# Patient Record
Sex: MALE | Race: WHITE | NOT HISPANIC OR LATINO | Employment: UNEMPLOYED | URBAN - METROPOLITAN AREA
[De-identification: names, ages, dates, MRNs, and addresses within clinical notes are randomized per-mention and may not be internally consistent; named-entity substitution may affect disease eponyms.]

---

## 2023-12-01 ENCOUNTER — TELEMEDICINE (OUTPATIENT)
Dept: BEHAVIORAL HEALTH | Facility: CLINIC | Age: 28
End: 2023-12-01
Payer: MEDICAID

## 2023-12-01 DIAGNOSIS — G40.309 NONINTRACTABLE GENERALIZED IDIOPATHIC EPILEPSY WITHOUT STATUS EPILEPTICUS (MULTI): ICD-10-CM

## 2023-12-01 DIAGNOSIS — F84.0 AUTISTIC DISORDER (HHS-HCC): ICD-10-CM

## 2023-12-01 DIAGNOSIS — Z79.899 HIGH RISK MEDICATION USE: ICD-10-CM

## 2023-12-01 DIAGNOSIS — F41.1 GAD (GENERALIZED ANXIETY DISORDER): ICD-10-CM

## 2023-12-01 DIAGNOSIS — F91.9 DISRUPTIVE BEHAVIOR DISORDER: Primary | ICD-10-CM

## 2023-12-01 DIAGNOSIS — F72 INTELLECTUAL DEVELOPMENTAL DISORDER, SEVERE: ICD-10-CM

## 2023-12-01 PROCEDURE — 90792 PSYCH DIAG EVAL W/MED SRVCS: CPT | Performed by: PSYCHIATRY & NEUROLOGY

## 2023-12-01 NOTE — PROGRESS NOTES
"Outpatient Psychiatry    A HIPAA-compliant interactive audio and video telecommunication system which permits real time communications between the patient (at the originating site) and provider (at the distant site) was utilized to provide this telehealth service.     The patient, family, caregivers and guardian (as appropriate) have provided consent on this date to conduct treatment via this telehealth service.  The patient's identity and physical location were verified at the time of this visit.      Present for appointment: Damian, mom/guardian (Elizabeth) and [facility] management (Monica).    SUBJECTIVE    Damian is a 28 year old man with IDD, autism, anxiety and aggressive behaviors presenting today to transfer care to our clinic.      He has most recently been followed at [facility] for psychiatric treatment for about the past 6 months; his most recent visit was 11/18/23 and no changes were made at that time.      He had been treated for most of his childhood and adolescence by his pediatric neurologist, Dr. Venus Flynn.  He has been tried on several different medications without symptomatic improvement.  Mom thought that he might have started having more problems after being taken off Depakote several years ago, but he did not have a positive response when Depakote was recently reintroduced.  He was reportedly tried on Ritalin in elementary school for problems with attention and hyperactivity, but mom reports that this just made him more irritable and \"mean.\"  Olanzapine was started in 2017.  He had been on higher doses of olanzapine (up to 10mg TID) but mom reports that this had caused an apparent increase in agitation.  The most recent medication change was the addition of scheduled lorazepam (around June/July 2023).    The as-needed medication (Haldol) does not seem to be helpful.  He receives it, on average, about two times per week.  Of note, he did receive it earlier today (around 11:00) for " agitation.    Mom reports that he was mostly a happy and easy-going child.  He really did not start having significant mood or behavioral problems until about 7-8 years ago.  Since then he has appeared more irritable and dixon.  He talks excessively and is usually quite loud.  He has problems with focus/concentration and impulsivity.  He can help with some chores around the house.  He is mostly independent in ADLs.  He likes going for rides to look at trucks on the road.    Current behavioral problems include physical aggression towards staff in the home, throwing/breaking property, self-injurious behaviors, emotional lability, intentionally urinating on himself or the floor.    He does not seem to go through discrete mood episodes.  He does not seem to be internally stimulated.  He has not had any previous inpatient psychiatric hospitalizations.    Towards the end of today's visit he starts to have an outburst because he did not want to be directed to use the restroom.  He came out of his room with his pants down; screaming/crying; hit himself in the head a few times.    He grew up around [place], [place].  He attended school through [employer] as well as the [facility] through [facility] [facility].  He aged out of school when he was 23.  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  Parents  about 8 years ago and Damian moved in with mom.  He sees dad on weekends.  He has one full biological sister and one step-sister.  Mom reports no other known family psychiatric history.  He does not use any tobacco, alcohol or other illicit drugs.  He moved into his current residential setting as of 10/07/2023.  Prior to this he had lived with mom.  He recently had a home visit with mom at [facility]; this was his first  time being back home since moving into his new home and mom says overall the visit went fairly well and he transitioned back home without any issues.    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  He had meningitis (likely viral) at 6 months of age.  He had some genetic testing done, but no clear etiology for his ASD/IDD was identified.  He is not currently treated for other medical problems.    He was diagnosed with seizures at age 3.  Initially, he would only have seizures when he had a febrile illness, but eventually he started having more unprovoked seizures.  He started taking routine anticonvulsants for seizures at age 11.  He began having GTC seizures about 1.5 to 2 years ago.  His last known seizure was > 6 months ago.      Review of Systems   Constitutional:  Positive for unexpected weight change (decrease over past few months).   HENT:  Negative for drooling and trouble swallowing.    Respiratory:  Negative for choking.    Gastrointestinal:  Negative for constipation, diarrhea and vomiting.   Genitourinary:  Positive for enuresis.   Musculoskeletal:  Negative for gait problem.   Neurological:  Negative for tremors, seizures and syncope.   Psychiatric/Behavioral:  Positive for agitation, behavioral problems and self-injury. Negative for sleep disturbance. The patient is hyperactive.      MEDICATION HISTORY  Atomoxetine  Buspirone  Clonazepam  Concerta  Depakote  Dexedrine  Fluoxetine  Quetiapine  Risperidone  Sertraline  Ziprasidone     Controlled Substance Evaluation  OARRS/PDMP reviewed: Emil Boone MD on 12/1/2023  9:31 AM  Is the patient prescribed a combination of a benzodiazepine and opioid? No  I have personally reviewed the OARRS report for Damian Martinez.   I have considered the risks of abuse, dependence, addiction and diversion.    I believe that it is clinically appropriate for Damian Martinez to be prescribed this medication.    Last Urine Drug Screen:  No results found for this or  any previous visit (from the past 8760 hour(s)).  Clinical rationale for not completing a Urine Drug Screen: UDS Not Completed: Patient in managed care facility (nursing home, assisted living, other where patient does not self-administer medication)  Results as expected? N/A    Controlled Substance Agreement: Will be sent to guardian  Reviewed Controlled Substance Agreement, including but not limited to:  Benefits, risks, and alternatives to treatment with a controlled substance medication(s).    Prescribed Controlled Substances:  Benzodiazepines: Lorazepam  What is the patient's goal of therapy? Agitation/anxiety  Is this being achieved with current treatment? Somewhat  Activities of Daily Living:   Is your overall impression that this patient is benefiting (symptom reduction outweighs side effects) from benzodiazepine therapy? Yes   1. Physical Functioning: Same  2. Family Relationship: Same  3. Social Relationship: Same  4. Mood: Same  5. Sleep Patterns: Same  6. Overall Function: Same     CURRENT MEDICATIONS    Current Outpatient Medications:     haloperidol (Haldol) 5 mg tablet, , Disp: , Rfl:     lacosamide (Vimpat) 100 mg tablet, TAKE ONE  AND ONE-HALF TABLET BY MOUTH IN THE MORNING  TAKE ONE TABLET MIDDAY  AND TAKE ONE AND ONE-HALF TABLET AT BEDTIME, Disp: , Rfl:     LORazepam (Ativan) 1 mg tablet, Take 1 tablet (1 mg) by mouth 3 times a day., Disp: , Rfl:     nasal spray Nayzilam 5 mg/spray (0.1 mL) spray,non-aerosol, , Disp: , Rfl:     OLANZapine (ZyPREXA) 10 mg tablet, Take 2 tablets (20 mg) by mouth once daily. Take 1 tablet QAM, 1/2 tablet mid-day, and 1/2 tablet at bedtime, Disp: , Rfl:     polyethylene glycol (Glycolax, Miralax) 17 gram/dose powder, Take 17 g by mouth once daily., Disp: , Rfl:     topiramate (Topamax) 100 mg tablet, Take 1 tablet (100 mg) by mouth 2 times a day., Disp: , Rfl:     lamoTRIgine (LaMICtal) 100 mg tablet, Take 2 tablets (200 mg) by mouth 2 times a day., Disp: , Rfl:  "    SOCIAL HISTORY  Living situation [facility] home with 2:1 staff   Provider agency [facility] as of October 2023   Work or day program None currently   School N/A   Guardianship Mother (Elizabeth)   SSA ?   Bx Specialist ?   Nicotine None   Alcohol None   Other drugs None     OBJECTIVE    No results found for: \"HGB\", \"PLT\", \"NEUTROABS\", \"GLUCOSE\", \"NA\", \"K\", \"CO2\", \"CALCIUM\", \"CREATININE\", \"AST\", \"ALT\", \"HGBA1C\", \"AMMONIA\", \"TSH\", \"FREET4\", \"CHOL\", \"LDLF\", \"TRIG\", \"[id number]\", \"VITD25\"    Therapeutic Drug Monitoring  04/26/2023: Lamotrigine level = 16.7 [1-13] (400mg/day)  04/26/2023: Lacosamide level = 6.4 [2.2-19.8] (400mg/day)    Electrocardiograms  12/21/2022: SR, VR 67, QTc 419    MENTAL STATUS EXAM  General/Appearance: Appropriate dress/hygiene/grooming. Thin build. Acneic skin.  Attitude/Behavior: Difficult to engage. Guarded/apprehensive. Poor/limited eye contact.  Speech/Communication:  Just repeats \"pee your pants\" several times when asked questions.  Motor: Fidgets. No abnormal or involuntary movements observed.  Gait: Ambulates w/o difficulty.  Mood: Appears upset/irritable.  Affect: Dysphoric. Intense. Irritable.  Thought processes: Perseverative.  Thought content: Unable to assess.  Perception: Does not appear to be experiencing or responding to hallucinations.  Sensorium/Cognition: Oriented to self and surroundings. Minimal interest in participating.  Memory: Recent & remote recall is intact.  Insight: Minimal.  Judgment: Poor. Impulsive.    ASSESSMENT  Reviewed past medication trials and [employer] pharmacogenomic testing.  Discussed treatment options with guardian to target mood and impulsivity.  May consider trial of anti-adrenergic agents (beta blocker or alpha agonist) for impulsivity.  Might also consider trying an \"atypical\" antidepressant like mirtazapine for mood depending on how he responds to lithium.    PROBLEM LIST  Intellectual developmental disorder, severe  ASD  Disruptive " behavior  Anxiety  Epilepsy    PLAN  -- Check labs (BMP/TSH and serum olanzapine level)  -- Start lithium 300mg at bedtime for mood and aggression  -- Continue olanzapine 10mg - 5mg - 5mg for aggression and impulsivity  -- Continue lorazepam 1mg TID for anxiety  -- Change PRN to chlorpromazine 50mg Q6H  -- Follow up 10 weeks    Emil Boone MD    Prep time on date of the patient encounter: 5 minutes   Time spent directly with patient/family/caregiver: 90 minutes   Additional time spent on patient care activities: 0 minutes   Documentation time: 15 minutes   Other time spent: 0 minutes   Total time on date of patient encounter: 110 minutes

## 2023-12-02 PROBLEM — F41.1 GAD (GENERALIZED ANXIETY DISORDER): Status: ACTIVE | Noted: 2023-12-02

## 2023-12-02 PROBLEM — F72 INTELLECTUAL DEVELOPMENTAL DISORDER, SEVERE: Status: ACTIVE | Noted: 2023-12-02

## 2023-12-02 RX ORDER — OLANZAPINE 10 MG/1
10 TABLET ORAL EVERY MORNING
Qty: 31 TABLET | Refills: 2 | Status: SHIPPED | OUTPATIENT
Start: 2023-12-02 | End: 2024-02-02 | Stop reason: SDUPTHER

## 2023-12-02 RX ORDER — LORAZEPAM 1 MG/1
1 TABLET ORAL 3 TIMES DAILY
Qty: 93 TABLET | Refills: 2 | Status: SHIPPED | OUTPATIENT
Start: 2023-12-02 | End: 2024-02-02 | Stop reason: SDUPTHER

## 2023-12-02 RX ORDER — POLYETHYLENE GLYCOL 3350 17 G/17G
17 POWDER, FOR SOLUTION ORAL DAILY
COMMUNITY
Start: 2016-11-30

## 2023-12-02 RX ORDER — LAMOTRIGINE 100 MG/1
200 TABLET ORAL 2 TIMES DAILY
COMMUNITY

## 2023-12-02 RX ORDER — LACOSAMIDE 100 MG/1
TABLET ORAL
COMMUNITY
Start: 2022-07-10

## 2023-12-02 RX ORDER — OLANZAPINE 10 MG/1
2 TABLET ORAL
COMMUNITY
Start: 2018-03-17 | End: 2023-12-02 | Stop reason: SDUPTHER

## 2023-12-02 RX ORDER — OLANZAPINE 5 MG/1
TABLET ORAL
Qty: 62 TABLET | Refills: 2 | Status: SHIPPED | OUTPATIENT
Start: 2023-12-02 | End: 2024-02-02 | Stop reason: SDUPTHER

## 2023-12-02 RX ORDER — TOPIRAMATE 100 MG/1
100 TABLET, FILM COATED ORAL 2 TIMES DAILY
COMMUNITY
Start: 2023-11-04

## 2023-12-02 RX ORDER — CHLORPROMAZINE HYDROCHLORIDE 50 MG/1
TABLET, FILM COATED ORAL
Qty: 60 TABLET | Refills: 0 | Status: SHIPPED | OUTPATIENT
Start: 2023-12-02 | End: 2024-02-02 | Stop reason: ALTCHOICE

## 2023-12-02 RX ORDER — MIDAZOLAM 5 MG/.1ML
SPRAY NASAL
COMMUNITY
Start: 2023-10-06

## 2023-12-02 RX ORDER — LORAZEPAM 1 MG/1
1 TABLET ORAL 3 TIMES DAILY
COMMUNITY
Start: 2023-05-27 | End: 2023-12-02 | Stop reason: SDUPTHER

## 2023-12-02 RX ORDER — HALOPERIDOL 5 MG/1
TABLET ORAL
COMMUNITY
Start: 2023-10-06 | End: 2023-12-02

## 2023-12-02 ASSESSMENT — ENCOUNTER SYMPTOMS
TREMORS: 0
VOMITING: 0
HYPERACTIVE: 1
CONSTIPATION: 0
TROUBLE SWALLOWING: 0
AGITATION: 1
UNEXPECTED WEIGHT CHANGE: 1
CHOKING: 0
SLEEP DISTURBANCE: 0
DIARRHEA: 0
SEIZURES: 0

## 2023-12-02 NOTE — PATIENT INSTRUCTIONS
Check updated/baseline labs (metabolic panel, thyroid, Zyprexa level) -- if within normal range we will start trial of lithium 300mg once daily at bedtime for mood and impulsive aggression.  Note - labs do NOT need to be fasting.    CHANGE as-needed medication to Thorazine (chlorpromazine) 50mg by mouth every 6 hours (discontinue Haldol).    Next appointment: Friday 2/09/2024 at 10:30 AM lynne.

## 2023-12-05 ENCOUNTER — TELEPHONE (OUTPATIENT)
Dept: OTHER | Age: 28
End: 2023-12-05
Payer: MEDICAID

## 2023-12-18 DIAGNOSIS — F91.9 DISRUPTIVE BEHAVIOR DISORDER: ICD-10-CM

## 2023-12-18 RX ORDER — LITHIUM CARBONATE 300 MG/1
300 CAPSULE ORAL NIGHTLY
Qty: 31 CAPSULE | Refills: 2 | Status: SHIPPED | OUTPATIENT
Start: 2023-12-18 | End: 2024-02-02 | Stop reason: SDUPTHER

## 2023-12-18 NOTE — PROGRESS NOTES
Lab results faxed over from [facility]:  -- BMP, TSH and FT4 all normal.  -- Serum olanzapine level pending.  Sent Rx to start lithium and updated agency via email.

## 2024-02-02 ENCOUNTER — TELEMEDICINE (OUTPATIENT)
Dept: BEHAVIORAL HEALTH | Facility: CLINIC | Age: 29
End: 2024-02-02
Payer: MEDICAID

## 2024-02-02 DIAGNOSIS — G40.309 NONINTRACTABLE GENERALIZED IDIOPATHIC EPILEPSY WITHOUT STATUS EPILEPTICUS (MULTI): ICD-10-CM

## 2024-02-02 DIAGNOSIS — F41.1 GAD (GENERALIZED ANXIETY DISORDER): ICD-10-CM

## 2024-02-02 DIAGNOSIS — F91.9 DISRUPTIVE BEHAVIOR DISORDER: Primary | ICD-10-CM

## 2024-02-02 DIAGNOSIS — F84.0 AUTISTIC DISORDER (HHS-HCC): ICD-10-CM

## 2024-02-02 DIAGNOSIS — F72 INTELLECTUAL DEVELOPMENTAL DISORDER, SEVERE: ICD-10-CM

## 2024-02-02 PROCEDURE — 99214 OFFICE O/P EST MOD 30 MIN: CPT | Performed by: PSYCHIATRY & NEUROLOGY

## 2024-02-02 RX ORDER — OLANZAPINE 5 MG/1
TABLET ORAL
Qty: 62 TABLET | Refills: 1 | Status: SHIPPED | OUTPATIENT
Start: 2024-02-02 | End: 2024-03-22 | Stop reason: SDUPTHER

## 2024-02-02 RX ORDER — OLANZAPINE 10 MG/1
10 TABLET ORAL EVERY MORNING
Qty: 31 TABLET | Refills: 1 | Status: SHIPPED | OUTPATIENT
Start: 2024-02-02 | End: 2024-03-22 | Stop reason: SDUPTHER

## 2024-02-02 RX ORDER — HALOPERIDOL 5 MG/1
TABLET ORAL
Qty: 60 TABLET | Refills: 0 | Status: SHIPPED | OUTPATIENT
Start: 2024-02-02 | End: 2024-03-22 | Stop reason: DRUGHIGH

## 2024-02-02 RX ORDER — LITHIUM CARBONATE 600 MG/1
600 CAPSULE ORAL NIGHTLY
Qty: 31 CAPSULE | Refills: 1 | Status: SHIPPED | OUTPATIENT
Start: 2024-02-02 | End: 2024-03-22 | Stop reason: SDUPTHER

## 2024-02-02 RX ORDER — LORAZEPAM 1 MG/1
1 TABLET ORAL 3 TIMES DAILY
Qty: 93 TABLET | Refills: 1 | Status: SHIPPED | OUTPATIENT
Start: 2024-02-02 | End: 2024-03-22 | Stop reason: SDUPTHER

## 2024-02-02 ASSESSMENT — ENCOUNTER SYMPTOMS
VOMITING: 0
SLEEP DISTURBANCE: 0
CHOKING: 0
DIARRHEA: 0
CONSTIPATION: 0
TROUBLE SWALLOWING: 0
HYPERACTIVE: 1
AGITATION: 1
TREMORS: 0
SEIZURES: 0

## 2024-02-02 NOTE — PATIENT INSTRUCTIONS
Increase lithium to 600mg daily for mood stabilization and aggression.  Change as-needed medication to Haldol 5mg PO every 6 hours.  Next appointment: Friday 3/22/2024 at 9:45 AM virtual.

## 2024-02-02 NOTE — PROGRESS NOTES
Outpatient Psychiatry    A HIPAA-compliant interactive audio and video telecommunication system which permits real time communications between the patient (at the originating site) and provider (at the distant site) was utilized to provide this telehealth service.     The patient, family, caregivers and guardian (as appropriate) have provided consent on this date to conduct treatment via this telehealth service.  The patient's identity and physical location were verified at the time of this visit.      Present for appointment: Damian, mom/guardian (Elizabeth) and [facility] management (Monica).    SUBJECTIVE    No significant changes or improvements since last visit.        Damian continues to have significant problems with physical aggression towards staff in the home, throwing/breaking property, self-injurious behaviors, emotional lability, obsessive/perseverative thinking/talking.    As-needed chlorpromazine utilized 16 times in January (received two doses on 1/01 and 1/19).  They do not perceived the chlorpromazine to be particularly helpful.    He does not seem to be having side effects from the lithium.    He has not had any seizures since last visit.  Current ASMs: lacosamide 400mg/day, lamotrigine 400mg/day, topiramate 200mg/day.    Review of Systems   Constitutional:  Negative for activity change and appetite change.   HENT:  Negative for drooling and trouble swallowing.    Respiratory:  Negative for choking.    Gastrointestinal:  Negative for constipation, diarrhea and vomiting.   Endocrine: Negative for polydipsia and polyuria.   Genitourinary:  Negative for enuresis.   Musculoskeletal:  Negative for gait problem.   Neurological:  Negative for tremors, seizures and syncope.   Psychiatric/Behavioral:  Positive for agitation, behavioral problems and self-injury. Negative for sleep disturbance. The patient is hyperactive.      MEDICATION  HISTORY  Atomoxetine  Buspirone  Clonazepam  Concerta  Depakote  Dexedrine  Fluoxetine  Quetiapine  Risperidone  Sertraline  Ziprasidone     Controlled Substance Evaluation  OARRS/PDMP reviewed: Emil Boone MD on 2/2/2024  7:27 AM  Is the patient prescribed a combination of a benzodiazepine and opioid? No  I have personally reviewed the OARRS report for Damian Martinez.   I have considered the risks of abuse, dependence, addiction and diversion.    I believe that it is clinically appropriate for Damian Martinez to be prescribed this medication.    Last Urine Drug Screen:  No results found for this or any previous visit (from the past 8760 hour(s)).  Clinical rationale for not completing a Urine Drug Screen: UDS Not Completed: Patient in managed care facility (nursing home, assisted living, other where patient does not self-administer medication)  Results as expected? N/A    Controlled Substance Agreement: 12/05/2023  Reviewed Controlled Substance Agreement, including but not limited to:  Benefits, risks, and alternatives to treatment with a controlled substance medication(s).    Prescribed Controlled Substances:  Benzodiazepines: Lorazepam  What is the patient's goal of therapy? Agitation/anxiety  Is this being achieved with current treatment? Somewhat  Activities of Daily Living:   Is your overall impression that this patient is benefiting (symptom reduction outweighs side effects) from benzodiazepine therapy? Yes   1. Physical Functioning: Same  2. Family Relationship: Same  3. Social Relationship: Same  4. Mood: Same  5. Sleep Patterns: Same  6. Overall Function: Same     CURRENT MEDICATIONS    Current Outpatient Medications:     haloperidol (Haldol) 5 mg tablet, Give 1 tablet by mouth every 6 hours as needed for symptoms of disruptive behavior disorder (severe SIB or aggression) that lasts > 15 minutes despite attempts to behaviorally redirect., Disp: 60 tablet, Rfl: 0    lacosamide (Vimpat) 100 mg tablet,  "TAKE ONE  AND ONE-HALF TABLET BY MOUTH IN THE MORNING  TAKE ONE TABLET MIDDAY  AND TAKE ONE AND ONE-HALF TABLET AT BEDTIME, Disp: , Rfl:     lamoTRIgine (LaMICtal) 100 mg tablet, Take 2 tablets (200 mg) by mouth 2 times a day., Disp: , Rfl:     lithium 600 mg capsule, Take 1 capsule (600 mg) by mouth once daily at bedtime., Disp: 31 capsule, Rfl: 1    LORazepam (Ativan) 1 mg tablet, Take 1 tablet (1 mg) by mouth 3 times a day., Disp: 93 tablet, Rfl: 1    nasal spray Nayzilam 5 mg/spray (0.1 mL) spray,non-aerosol, , Disp: , Rfl:     OLANZapine (ZyPREXA) 10 mg tablet, Take 1 tablet (10 mg) by mouth once daily in the morning., Disp: 31 tablet, Rfl: 1    OLANZapine (ZyPREXA) 5 mg tablet, Take 1 tablet (5 mg) by mouth twice daily in the afternoon and evening., Disp: 62 tablet, Rfl: 1    polyethylene glycol (Glycolax, Miralax) 17 gram/dose powder, Take 17 g by mouth once daily., Disp: , Rfl:     topiramate (Topamax) 100 mg tablet, Take 1 tablet (100 mg) by mouth 2 times a day., Disp: , Rfl:     SOCIAL HISTORY  Living situation [facility] home with 2:1 staff   Provider agency [facility] as of October 2023   Work or day program None currently   School N/A   Guardianship Mother (Elizabeth)   SSA ?   Bx Specialist ?   Nicotine None   Alcohol None   Other drugs None     OBJECTIVE    No results found for: \"HGB\", \"PLT\", \"NEUTROABS\", \"GLUCOSE\", \"NA\", \"K\", \"CO2\", \"CALCIUM\", \"CREATININE\", \"AST\", \"ALT\", \"HGBA1C\", \"AMMONIA\", \"TSH\", \"FREET4\", \"CHOL\", \"LDLF\", \"TRIG\", \"[id number]\", \"VITD25\"    Therapeutic Drug Monitoring  12/11/2023: Lamotrigine level = 10.2 [1-13] (400mg/day)  12/11/2023: Lacosamide level = 9.7 [2.2-19.8] (400mg/day)  12/11/2023: Topiramate level = 7.7 [5-20] (200mg/day)  04/26/2023: Lamotrigine level = 16.7 [1-13] (400mg/day)  04/26/2023: Lacosamide level = 6.4 [2.2-19.8] (400mg/day)    Electrocardiograms  12/21/2022: SR, VR 67, QTc 419    MENTAL STATUS EXAM  General/Appearance: Appropriate dress/hygiene/grooming. Thin " build. Acneic skin.  Attitude/Behavior: Difficult to engage. Guarded/apprehensive. Poor/limited eye contact.  Speech/Communication: Repetitive phrases. Loud volume.  Motor: Fidgets. No abnormal or involuntary movements observed.  Gait: Ambulates w/o difficulty.  Mood: Appears upset/irritable.  Affect: Dysphoric. Intense. Irritable.  Thought processes: Perseverative.  Thought content: Unable to assess. Repeatedly asks about a certain staff worker.  Perception: Does not appear to be experiencing or responding to hallucinations.  Sensorium/Cognition: Oriented to self and surroundings. Minimal interest in participating.  Memory: Recent & remote recall is intact.  Insight: Minimal.  Judgment: Poor. Impulsive.    ASSESSMENT  No changes (better or worse) noted since starting lithium trial -- increase dose again today.  We will try changing the as-needed medication back to Haldol at a dose of 5mg.  We may consider increasing lorazepam versus a trial of an alpha agonist at next follow-up.    PROBLEM LIST  Intellectual developmental disorder, severe  ASD  Disruptive behavior  Anxiety  Epilepsy    PLAN  -- Increase lithium to 600mg at bedtime for mood and aggression  -- Continue olanzapine 10mg - 5mg - 5mg for aggression and impulsivity  -- Continue lorazepam 1mg TID for anxiety  -- Change PRN to Haldol 5mg PO Q6H  -- Follow up about 8 weeks    Emil Boone MD    Prep time on date of the patient encounter: 5 minutes   Time spent directly with patient/family/caregiver: 30 minutes   Additional time spent on patient care activities: 0 minutes   Documentation time: 5 minutes   Other time spent: 0 minutes   Total time on date of patient encounter: 40 minutes

## 2024-02-03 ASSESSMENT — ENCOUNTER SYMPTOMS
ACTIVITY CHANGE: 0
POLYDIPSIA: 0
APPETITE CHANGE: 0

## 2024-02-09 ENCOUNTER — APPOINTMENT (OUTPATIENT)
Dept: BEHAVIORAL HEALTH | Facility: CLINIC | Age: 29
End: 2024-02-09
Payer: MEDICAID

## 2024-03-18 DIAGNOSIS — F91.9 DISRUPTIVE BEHAVIOR DISORDER: ICD-10-CM

## 2024-03-21 ENCOUNTER — TELEPHONE (OUTPATIENT)
Dept: BEHAVIORAL HEALTH | Facility: CLINIC | Age: 29
End: 2024-03-21
Payer: MEDICAID

## 2024-03-22 ENCOUNTER — TELEMEDICINE (OUTPATIENT)
Dept: BEHAVIORAL HEALTH | Facility: CLINIC | Age: 29
End: 2024-03-22
Payer: MEDICAID

## 2024-03-22 DIAGNOSIS — F84.0 AUTISTIC DISORDER (HHS-HCC): ICD-10-CM

## 2024-03-22 DIAGNOSIS — F91.9 DISRUPTIVE BEHAVIOR DISORDER: Primary | ICD-10-CM

## 2024-03-22 DIAGNOSIS — G40.309 NONINTRACTABLE GENERALIZED IDIOPATHIC EPILEPSY WITHOUT STATUS EPILEPTICUS (MULTI): ICD-10-CM

## 2024-03-22 DIAGNOSIS — F72 INTELLECTUAL DEVELOPMENTAL DISORDER, SEVERE: ICD-10-CM

## 2024-03-22 DIAGNOSIS — Z79.899 HIGH RISK MEDICATION USE: ICD-10-CM

## 2024-03-22 DIAGNOSIS — F41.1 GAD (GENERALIZED ANXIETY DISORDER): ICD-10-CM

## 2024-03-22 PROCEDURE — 99214 OFFICE O/P EST MOD 30 MIN: CPT | Performed by: PSYCHIATRY & NEUROLOGY

## 2024-03-22 PROCEDURE — 1036F TOBACCO NON-USER: CPT | Performed by: PSYCHIATRY & NEUROLOGY

## 2024-03-22 RX ORDER — CLONIDINE HYDROCHLORIDE 0.1 MG/1
TABLET ORAL
Qty: 62 TABLET | Refills: 2 | Status: SHIPPED | OUTPATIENT
Start: 2024-03-22 | End: 2024-05-13 | Stop reason: SDUPTHER

## 2024-03-22 RX ORDER — HALOPERIDOL 10 MG/1
TABLET ORAL
Qty: 30 TABLET | Refills: 0 | Status: SHIPPED | OUTPATIENT
Start: 2024-03-22 | End: 2024-05-13 | Stop reason: ALTCHOICE

## 2024-03-22 RX ORDER — LITHIUM CARBONATE 600 MG/1
600 CAPSULE ORAL NIGHTLY
Qty: 29 CAPSULE | Refills: 11 | OUTPATIENT
Start: 2024-03-22

## 2024-03-22 RX ORDER — OLANZAPINE 10 MG/1
10 TABLET ORAL EVERY MORNING
Qty: 31 TABLET | Refills: 2 | Status: SHIPPED | OUTPATIENT
Start: 2024-03-22 | End: 2024-05-13 | Stop reason: SDUPTHER

## 2024-03-22 RX ORDER — OLANZAPINE 5 MG/1
TABLET ORAL
Qty: 62 TABLET | Refills: 2 | Status: SHIPPED | OUTPATIENT
Start: 2024-03-22 | End: 2024-05-13 | Stop reason: SDUPTHER

## 2024-03-22 RX ORDER — LORAZEPAM 1 MG/1
1 TABLET ORAL 3 TIMES DAILY
Qty: 93 TABLET | Refills: 2 | Status: SHIPPED | OUTPATIENT
Start: 2024-03-22 | End: 2024-05-13 | Stop reason: SDUPTHER

## 2024-03-22 RX ORDER — LITHIUM CARBONATE 600 MG/1
600 CAPSULE ORAL NIGHTLY
Qty: 31 CAPSULE | Refills: 2 | Status: SHIPPED | OUTPATIENT
Start: 2024-03-22 | End: 2024-05-13 | Stop reason: SDUPTHER

## 2024-03-22 ASSESSMENT — ENCOUNTER SYMPTOMS
APPETITE CHANGE: 0
VOMITING: 0
SEIZURES: 0
AGITATION: 1
POLYDIPSIA: 0
TROUBLE SWALLOWING: 0
DIARRHEA: 0
SLEEP DISTURBANCE: 0
ACTIVITY CHANGE: 0
TREMORS: 0
CHOKING: 0
CONSTIPATION: 0
HYPERACTIVE: 1

## 2024-03-22 NOTE — PATIENT INSTRUCTIONS
Start clonidine 0.1mg twice daily (8am and 12pm) for impulsivity and aggression.  Check updated labs (lithium, TSH, BMP) - does NOT need to be fasting, orders are attached.  We can try increasing PRN medication to Haldol 10mg ONCE per day.  Next appointment: Monday 5/13/24 at 9:00 AM virtual.

## 2024-03-22 NOTE — PROGRESS NOTES
Outpatient Psychiatry    A HIPAA-compliant interactive audio and video telecommunication system which permits real time communications between the patient (at the originating site) and provider (at the distant site) was utilized to provide this telehealth service.     The patient, family, caregivers and guardian (as appropriate) have provided consent on this date to conduct treatment via this telehealth service.  The patient's identity and physical location were verified at the time of this visit.      Present for appointment: Damian, mom/guardian (Elizabeth), and [facility] staff (Nicho).    SUBJECTIVE    No significant health issues for Damian since last visit.    Damian continues to have significant problems with physical aggression towards staff in the home, throwing/breaking property, self-injurious behaviors, emotional lability, obsessive/perseverative thinking/talking.    He seems to be in a very good mood today as he is excited about going out for a long car ride with staff.    He has received as-needed Haldol 18 times since our last visit on 2/02/24; last given 3/18/24; it was given twice in one day on 3/12/24 (11:00 & 19:00).  The general sense is that the PRN is ineffective, though it does not seem to cause any side effects.  Scheduled meds passed at 08:00, 12:00, 20:00.    He has not had any seizures since last visit.  Current ASMs: lacosamide 400mg/day, lamotrigine 400mg/day, topiramate 200mg/day.    Review of Systems   Constitutional:  Negative for activity change and appetite change.   HENT:  Negative for drooling and trouble swallowing.    Respiratory:  Negative for choking.    Gastrointestinal:  Negative for constipation, diarrhea and vomiting.   Endocrine: Negative for polydipsia and polyuria.   Genitourinary:  Negative for enuresis.   Musculoskeletal:  Negative for gait problem.   Neurological:  Negative for tremors, seizures and syncope.   Psychiatric/Behavioral:  Positive for agitation,  behavioral problems and self-injury. Negative for sleep disturbance. The patient is hyperactive.      MEDICATION HISTORY  Atomoxetine  Buspirone  Clonazepam  Concerta  Depakote  Dexedrine  Fluoxetine  Quetiapine  Risperidone  Sertraline  Ziprasidone     Controlled Substance Evaluation  OARRS/PDMP reviewed: Emil Boone MD on 3/24/2024 10:49 AM  Is the patient prescribed a combination of a benzodiazepine and opioid? No  I have personally reviewed the OARRS report for Damian Martinez.   I have considered the risks of abuse, dependence, addiction and diversion.    I believe that it is clinically appropriate for Damian Martinez to be prescribed this medication.    Last Urine Drug Screen:  No results found for this or any previous visit (from the past 8760 hour(s)).  Clinical rationale for not completing a Urine Drug Screen: UDS Not Completed: Patient in managed care facility (nursing home, assisted living, other where patient does not self-administer medication)  Results as expected? N/A    Controlled Substance Agreement: 12/05/2023  Reviewed Controlled Substance Agreement, including but not limited to:  Benefits, risks, and alternatives to treatment with a controlled substance medication(s).    Prescribed Controlled Substances:  Benzodiazepines: Lorazepam  What is the patient's goal of therapy? Agitation/anxiety  Is this being achieved with current treatment? Somewhat  Activities of Daily Living:   Is your overall impression that this patient is benefiting (symptom reduction outweighs side effects) from benzodiazepine therapy? Yes   1. Physical Functioning: Same  2. Family Relationship: Same  3. Social Relationship: Same  4. Mood: Same  5. Sleep Patterns: Same  6. Overall Function: Same     CURRENT MEDICATIONS    Current Outpatient Medications:     cloNIDine (Catapres) 0.1 mg tablet, Take 1 tablet (0.1 mg) by mouth twice daily - morning and 12pm., Disp: 62 tablet, Rfl: 2    haloperidol (Haldol) 10 mg tablet, Give 1  "tablet by mouth ONCE daily as needed for symptoms of disruptive behavior disorder (severe SIB or aggression) that lasts > 15 minutes despite attempts to behaviorally redirect., Disp: 30 tablet, Rfl: 0    lacosamide (Vimpat) 100 mg tablet, TAKE ONE  AND ONE-HALF TABLET BY MOUTH IN THE MORNING  TAKE ONE TABLET MIDDAY  AND TAKE ONE AND ONE-HALF TABLET AT BEDTIME, Disp: , Rfl:     lamoTRIgine (LaMICtal) 100 mg tablet, Take 2 tablets (200 mg) by mouth 2 times a day., Disp: , Rfl:     lithium 600 mg capsule, Take 1 capsule (600 mg) by mouth once daily at bedtime., Disp: 31 capsule, Rfl: 2    LORazepam (Ativan) 1 mg tablet, Take 1 tablet (1 mg) by mouth 3 times a day., Disp: 93 tablet, Rfl: 2    nasal spray Nayzilam 5 mg/spray (0.1 mL) spray,non-aerosol, , Disp: , Rfl:     OLANZapine (ZyPREXA) 10 mg tablet, Take 1 tablet (10 mg) by mouth once daily in the morning., Disp: 31 tablet, Rfl: 2    OLANZapine (ZyPREXA) 5 mg tablet, Take 1 tablet (5 mg) by mouth twice daily - 12pm an bedtime., Disp: 62 tablet, Rfl: 2    polyethylene glycol (Glycolax, Miralax) 17 gram/dose powder, Take 17 g by mouth once daily., Disp: , Rfl:     topiramate (Topamax) 100 mg tablet, Take 1 tablet (100 mg) by mouth 2 times a day., Disp: , Rfl:     SOCIAL HISTORY  Living situation [facility] home with 2:1 staff   Provider agency [facility] as of October 2023   Work or day program None currently   School N/A   Guardianship Mother (Elizabeth)   SSA ?   Bx Specialist ?   Nicotine None   Alcohol None   Other drugs None     OBJECTIVE    No results found for: \"HGB\", \"PLT\", \"NEUTROABS\", \"GLUCOSE\", \"NA\", \"K\", \"CO2\", \"CALCIUM\", \"CREATININE\", \"AST\", \"ALT\", \"HGBA1C\", \"AMMONIA\", \"TSH\", \"FREET4\", \"CHOL\", \"LDLF\", \"TRIG\", \"ECITOFYS68\", \"VITD25\"    Therapeutic Drug Monitoring  12/11/2023: Lamotrigine level = 10.2 [1-13] (400mg/day)  12/11/2023: Lacosamide level = 9.7 [2.2-19.8] (400mg/day)  12/11/2023: Topiramate level = 7.7 [5-20] (200mg/day)  04/26/2023: Lamotrigine " level = 16.7 [1-13] (400mg/day)  04/26/2023: Lacosamide level = 6.4 [2.2-19.8] (400mg/day)    Electrocardiograms  12/21/2022: SR, VR 67, QTc 419    MENTAL STATUS EXAM  General/Appearance: Appropriate dress/hygiene/grooming. Thin build. Acneic skin.  Attitude/Behavior: Difficult to engage. Poor/limited eye contact.  Speech/Communication: Repetitive phrases. Loud volume.  Motor: Fidgets. No abnormal or involuntary movements observed.  Gait: Ambulates w/o difficulty.  Mood: Calm/pleasant.  Affect: Congruent. Intense.  Thought processes: Perseverative.  Thought content: Jumps from one unrelated topic to another.  Perception: Does not appear to be experiencing or responding to hallucinations.  Sensorium/Cognition: Oriented to self and surroundings. Minimal interest in participating. Very unfocused/distracted today.  Memory: Not directly assessed at this time.  Insight: Minimal.  Judgment: Poor. Impulsive.    ASSESSMENT  No significant changes or improvements in overall mood, aggression, or impulsivity.  We need to check a baseline lithium level to help guide medication titration.  Mom is agreeable to trying (or possibly re-trying) clonidine for impulsivity.  We can try increasing the as-needed Haldol from 5mg to 10mg but limited to just once per day.    PROBLEM LIST  Intellectual developmental disorder, severe  ASD  Disruptive behavior  Anxiety  Epilepsy    PLAN  -- Start clonidine 0.1mg twice daily (AM & 12:00) for impulsivity  -- Continue lithium 600mg at bedtime for mood and aggression  -- Continue olanzapine 10mg - 5mg - 5mg (AM, 12:00, HS) for aggression and impulsivity  -- Continue lorazepam 1mg TID (AM, 12:00, HS) for anxiety  -- Change PRN to Haldol 10mg PO once daily  -- Follow up 8 weeks    Emil Boone MD    Prep time on date of the patient encounter: 5 minutes   Time spent directly with patient/family/caregiver: 30 minutes   Additional time spent on patient care activities: 0 minutes   Documentation  time: 5 minutes   Other time spent: 0 minutes   Total time on date of patient encounter: 40 minutes      initiation of breastfeeding/breast milk feeding

## 2024-05-13 ENCOUNTER — OFFICE VISIT (OUTPATIENT)
Dept: BEHAVIORAL HEALTH | Facility: CLINIC | Age: 29
End: 2024-05-13
Payer: MEDICAID

## 2024-05-13 DIAGNOSIS — F91.9 DISRUPTIVE BEHAVIOR DISORDER: Primary | ICD-10-CM

## 2024-05-13 DIAGNOSIS — F41.1 GAD (GENERALIZED ANXIETY DISORDER): ICD-10-CM

## 2024-05-13 DIAGNOSIS — G40.309 NONINTRACTABLE GENERALIZED IDIOPATHIC EPILEPSY WITHOUT STATUS EPILEPTICUS (MULTI): ICD-10-CM

## 2024-05-13 DIAGNOSIS — F72 INTELLECTUAL DEVELOPMENTAL DISORDER, SEVERE: ICD-10-CM

## 2024-05-13 DIAGNOSIS — F84.0 AUTISTIC DISORDER (HHS-HCC): ICD-10-CM

## 2024-05-13 PROCEDURE — 99214 OFFICE O/P EST MOD 30 MIN: CPT | Performed by: PSYCHIATRY & NEUROLOGY

## 2024-05-13 PROCEDURE — 1036F TOBACCO NON-USER: CPT | Performed by: PSYCHIATRY & NEUROLOGY

## 2024-05-13 RX ORDER — LITHIUM CARBONATE 600 MG/1
600 CAPSULE ORAL NIGHTLY
Qty: 31 CAPSULE | Refills: 1 | Status: SHIPPED | OUTPATIENT
Start: 2024-05-13 | End: 2024-05-20 | Stop reason: SINTOL

## 2024-05-13 RX ORDER — LORAZEPAM 1 MG/1
1 TABLET ORAL 3 TIMES DAILY
Qty: 93 TABLET | Refills: 1 | Status: SHIPPED | OUTPATIENT
Start: 2024-05-13

## 2024-05-13 RX ORDER — OLANZAPINE 15 MG/1
TABLET, ORALLY DISINTEGRATING ORAL
Qty: 30 TABLET | Refills: 0 | Status: SHIPPED | OUTPATIENT
Start: 2024-05-13

## 2024-05-13 RX ORDER — OLANZAPINE 5 MG/1
TABLET ORAL
Qty: 62 TABLET | Refills: 1 | Status: SHIPPED | OUTPATIENT
Start: 2024-05-13

## 2024-05-13 RX ORDER — OLANZAPINE 10 MG/1
10 TABLET ORAL EVERY MORNING
Qty: 31 TABLET | Refills: 1 | Status: SHIPPED | OUTPATIENT
Start: 2024-05-13

## 2024-05-13 RX ORDER — CLONIDINE HYDROCHLORIDE 0.1 MG/1
TABLET ORAL
Qty: 62 TABLET | Refills: 1 | Status: SHIPPED | OUTPATIENT
Start: 2024-05-13 | End: 2024-05-20 | Stop reason: SDUPTHER

## 2024-05-13 RX ORDER — LITHIUM CARBONATE 300 MG/1
300 CAPSULE ORAL NIGHTLY
Qty: 31 CAPSULE | Refills: 1 | Status: SHIPPED | OUTPATIENT
Start: 2024-05-13 | End: 2024-05-20 | Stop reason: SINTOL

## 2024-05-13 ASSESSMENT — ENCOUNTER SYMPTOMS
ACTIVITY CHANGE: 0
POLYDIPSIA: 0
TROUBLE SWALLOWING: 0
SLEEP DISTURBANCE: 0
VOMITING: 0
CHOKING: 0
AGITATION: 1
HYPERACTIVE: 1
SEIZURES: 0
APPETITE CHANGE: 0
CONSTIPATION: 0
DIARRHEA: 0
TREMORS: 0

## 2024-05-13 NOTE — PROGRESS NOTES
Outpatient Psychiatry    A HIPAA-compliant interactive audio and video telecommunication system which permits real time communications between the patient (at the originating site) and provider (at the distant site) was utilized to provide this telehealth service.     The patient, family, caregivers and guardian (as appropriate) have provided consent on this date to conduct treatment via this telehealth service.  The patient's identity and physical location were verified at the time of this visit.      Present for appointment: Damian, mom/guardian (Elizabeth), and [facility] management (Lee).    SUBJECTIVE    No significant health issues for Damian since last visit on 3/22/24.  Reviewed labs done following our last appointment.    Damian continues to have significant problems with physical aggression towards staff in the home, throwing/breaking property, self-injurious behaviors, emotional lability, obsessive/perseverative thinking/talking.  They are trying to get a safety divider to place in the car during transportation due to his repeated history of unsafe behaviors while staff are driving.    As-needed Haldol 10mg has not been particularly effective; received 9 times in April and 2 times so far in May.    He has not had any seizures since last visit.  Current ASMs: lacosamide 400mg/day, lamotrigine 400mg/day, topiramate 200mg/day.    Review of Systems   Constitutional:  Negative for activity change and appetite change.   HENT:  Negative for drooling and trouble swallowing.    Respiratory:  Negative for choking.    Gastrointestinal:  Negative for constipation, diarrhea and vomiting.   Endocrine: Negative for polydipsia and polyuria.   Genitourinary:  Negative for enuresis.   Musculoskeletal:  Negative for gait problem.   Neurological:  Negative for tremors, seizures and syncope.   Psychiatric/Behavioral:  Positive for agitation, behavioral problems and self-injury. Negative for sleep disturbance. The patient is  hyperactive.       Controlled Substance Evaluation  OARRS/PDMP reviewed: Emil Boone MD on 5/13/2024  7:16 AM  Is the patient prescribed a combination of a benzodiazepine and opioid? No  I have personally reviewed the OARRS report for Damian Martinez.   I have considered the risks of abuse, dependence, addiction and diversion.    I believe that it is clinically appropriate for Damian Martinez to be prescribed this medication.    Last Urine Drug Screen:  No results found for this or any previous visit (from the past 8760 hour(s)).  Clinical rationale for not completing a Urine Drug Screen: UDS Not Completed: Patient in managed care facility (nursing home, assisted living, other where patient does not self-administer medication)    Controlled Substance Agreement: 12/05/2023  Reviewed Controlled Substance Agreement, including but not limited to:  Benefits, risks, and alternatives to treatment with a controlled substance medication(s).    Prescribed Controlled Substances:  > Benzodiazepines: Lorazepam  What is the patient's goal of therapy? Agitation/anxiety  Is this being achieved with current treatment? Somewhat  Activities of Daily Living:   Is your overall impression that this patient is benefiting (symptom reduction outweighs side effects) from benzodiazepine therapy? Yes   1. Physical Functioning: Same  2. Family Relationship: Same  3. Social Relationship: Same  4. Mood: Same  5. Sleep Patterns: Same  6. Overall Function: Same     MEDICATION HISTORY  Atomoxetine  Buspirone  Clonazepam  Concerta  Depakote  Dexedrine  Fluoxetine  Haldol  Quetiapine  Risperidone  Sertraline  Ziprasidone    CURRENT MEDICATIONS    Current Outpatient Medications:     cloNIDine (Catapres) 0.1 mg tablet, Take 1 tablet (0.1 mg) by mouth twice daily - morning and 12pm., Disp: 62 tablet, Rfl: 2    haloperidol (Haldol) 10 mg tablet, Give 1 tablet by mouth ONCE daily as needed for symptoms of disruptive behavior disorder (severe SIB or  "aggression) that lasts > 15 minutes despite attempts to behaviorally redirect., Disp: 30 tablet, Rfl: 0    lacosamide (Vimpat) 100 mg tablet, TAKE ONE  AND ONE-HALF TABLET BY MOUTH IN THE MORNING  TAKE ONE TABLET MIDDAY  AND TAKE ONE AND ONE-HALF TABLET AT BEDTIME, Disp: , Rfl:     lamoTRIgine (LaMICtal) 100 mg tablet, Take 2 tablets (200 mg) by mouth 2 times a day., Disp: , Rfl:     lithium 600 mg capsule, Take 1 capsule (600 mg) by mouth once daily at bedtime., Disp: 31 capsule, Rfl: 2    LORazepam (Ativan) 1 mg tablet, Take 1 tablet (1 mg) by mouth 3 times a day., Disp: 93 tablet, Rfl: 2    nasal spray Nayzilam 5 mg/spray (0.1 mL) spray,non-aerosol, , Disp: , Rfl:     OLANZapine (ZyPREXA) 10 mg tablet, Take 1 tablet (10 mg) by mouth once daily in the morning., Disp: 31 tablet, Rfl: 2    OLANZapine (ZyPREXA) 5 mg tablet, Take 1 tablet (5 mg) by mouth twice daily - 12pm an bedtime., Disp: 62 tablet, Rfl: 2    polyethylene glycol (Glycolax, Miralax) 17 gram/dose powder, Take 17 g by mouth once daily., Disp: , Rfl:     topiramate (Topamax) 100 mg tablet, Take 1 tablet (100 mg) by mouth 2 times a day., Disp: , Rfl:     SOCIAL HISTORY  Living situation [facility] home with 2:1 staff   Provider agency [facility] as of October 2023   Work or day program None currently   School N/A   Guardianship Mother (Elizabeth)   SSA ?   Bx Specialist ?   Nicotine None   Alcohol None   Other drugs None     OBJECTIVE    No results found for: \"HGB\", \"PLT\", \"NEUTROABS\", \"GLUCOSE\", \"NA\", \"K\", \"CO2\", \"CALCIUM\", \"CREATININE\", \"AST\", \"ALT\", \"HGBA1C\", \"AMMONIA\", \"TSH\", \"FREET4\", \"CHOL\", \"LDLF\", \"TRIG\", \"[id number]\", \"VITD25\"    Therapeutic Drug Monitoring  03/26/2024: Lithium level = 0.5 (600mg/day)  12/11/2023: Lamotrigine level = 10.2 [1-13] (400mg/day)  12/11/2023: Lacosamide level = 9.7 [2.2-19.8] (400mg/day)  12/11/2023: Topiramate level = 7.7 [5-20] (200mg/day)  04/26/2023: Lamotrigine level = 16.7 [1-13] (400mg/day)  04/26/2023: " Lacosamide level = 6.4 [2.2-19.8] (400mg/day)    Electrocardiograms  12/21/2022: SR, VR 67, QTc 419    MENTAL STATUS EXAM  General/Appearance: Appropriate dress/hygiene/grooming.  Attitude/Behavior: Difficult to engage. Poor/limited eye contact.  Speech/Communication: Single-word replies.  Motor: Fidgets. No abnormal or involuntary movements observed.  Gait: Ambulates w/o difficulty.  Mood: Calm/pleasant.  Affect: Congruent.  Thought processes: Perseverative.  Thought content: Wants to end interview after a few seconds.  Perception: Does not appear to be experiencing or responding to hallucinations.  Sensorium/Cognition: Oriented to self and surroundings. Minimal interest in participating. Unfocused/distracted.  Memory: Not directly assessed at this time.  Insight: Minimal.  Judgment: Poor. Impulsive.    ASSESSMENT  Will continue to titrate lithium and try changing as-needed medication back to olanzapine.  If lithium does not seem to be beneficial then I would try to continue adjusting clonidine and possibly increasing his scheduled doses of olanzapine.    PROBLEM LIST  Intellectual developmental disorder, severe  ASD  Disruptive behavior  Anxiety  Epilepsy    PLAN  -- Increase lithium to 900mg at bedtime for mood and aggression  -- Continue olanzapine 10mg - 5mg - 5mg (AM, 12:00, HS) for aggression and impulsivity  -- Continue clonidine 0.1mg twice daily (AM & 12:00) for impulsivity  -- Continue lorazepam 1mg TID (AM, 12:00, HS) for anxiety  -- PRN: olanzapine ODT 15mg PO once daily  -- Follow up 1 month    Emil Boone MD    Prep time on date of the patient encounter: 5 minutes   Time spent directly with patient/family/caregiver: 30 minutes   Additional time spent on patient care activities: 0 minutes   Documentation time: 5 minutes   Other time spent: 0 minutes   Total time on date of patient encounter: 40 minutes

## 2024-05-13 NOTE — PATIENT INSTRUCTIONS
Increase lithium to 900mg at bedtime for impulsive/aggressive behaviors.  Change as-needed medication from Haldol 10mg to Olanzapine 15mg.  Next appointment: Thursday 6/20/2024 at 9:00 AM joey

## 2024-05-20 ENCOUNTER — TELEPHONE (OUTPATIENT)
Dept: BEHAVIORAL HEALTH | Facility: CLINIC | Age: 29
End: 2024-05-20
Payer: MEDICAID

## 2024-05-20 DIAGNOSIS — F91.9 DISRUPTIVE BEHAVIOR DISORDER: ICD-10-CM

## 2024-05-20 RX ORDER — CLONIDINE HYDROCHLORIDE 0.1 MG/1
0.1 TABLET ORAL 3 TIMES DAILY
Qty: 93 TABLET | Refills: 1 | Status: SHIPPED | OUTPATIENT
Start: 2024-05-20

## 2024-05-20 NOTE — TELEPHONE ENCOUNTER
Contacted today by provider agency with concerns about mental status changes that sound very concerning for lithium toxicity.    Recommendation is to discontinue lithium and focus on treatment with olanzapine and clonidine.

## 2024-06-06 ENCOUNTER — TELEPHONE (OUTPATIENT)
Dept: BEHAVIORAL HEALTH | Facility: CLINIC | Age: 29
End: 2024-06-06
Payer: MEDICAID

## 2024-06-06 DIAGNOSIS — F91.9 DISRUPTIVE BEHAVIOR DISORDER: ICD-10-CM

## 2024-06-06 RX ORDER — LOXAPINE SUCCINATE 5 MG/1
5 TABLET ORAL 2 TIMES DAILY
Qty: 62 CAPSULE | Refills: 0 | Status: SHIPPED | OUTPATIENT
Start: 2024-06-06

## 2024-06-06 RX ORDER — DOCUSATE SODIUM 100 MG/1
100 CAPSULE, LIQUID FILLED ORAL DAILY
COMMUNITY
Start: 2024-06-03

## 2024-06-06 RX ORDER — OMEPRAZOLE 20 MG/1
20 CAPSULE, DELAYED RELEASE ORAL DAILY
COMMUNITY
Start: 2024-06-03

## 2024-06-06 NOTE — TELEPHONE ENCOUNTER
Spoke with [facility] owner (Lee).  Damian seems to be having increased PA and impulsivity since removing lithium.  Again, PRN medication reportedly offering no benefits.  Side effects have improved/resolved.  Might consider trial of clozapine, though not sure how well he would tolerate weekly blood draws; can tentatively try crossing over from olanzapine to loxapine.    -- Start trial of loxapine 5mg BID (AM & HS)  -- Continue olanzapine 10mg - 5mg - 5mg (AM, 12:00, HS) for aggression and impulsivity  -- Continue clonidine 0.1mg TID (AM, 12:00, HS)ffor impulsivity  -- Continue lorazepam 1mg TID (AM, 12:00, HS) for anxiety  -- PRN: olanzapine ODT 15mg PO once daily  -- Follow up scheduled for 6/20/24

## 2024-06-20 ENCOUNTER — APPOINTMENT (OUTPATIENT)
Dept: BEHAVIORAL HEALTH | Facility: CLINIC | Age: 29
End: 2024-06-20
Payer: MEDICAID

## 2024-06-20 DIAGNOSIS — G40.309 NONINTRACTABLE GENERALIZED IDIOPATHIC EPILEPSY WITHOUT STATUS EPILEPTICUS (MULTI): ICD-10-CM

## 2024-06-20 DIAGNOSIS — F72 INTELLECTUAL DEVELOPMENTAL DISORDER, SEVERE: ICD-10-CM

## 2024-06-20 DIAGNOSIS — F84.0 AUTISTIC DISORDER (HHS-HCC): ICD-10-CM

## 2024-06-20 DIAGNOSIS — F41.1 GAD (GENERALIZED ANXIETY DISORDER): ICD-10-CM

## 2024-06-20 DIAGNOSIS — F91.9 DISRUPTIVE BEHAVIOR DISORDER: Primary | ICD-10-CM

## 2024-06-20 PROCEDURE — 99214 OFFICE O/P EST MOD 30 MIN: CPT | Performed by: PSYCHIATRY & NEUROLOGY

## 2024-06-20 PROCEDURE — 1036F TOBACCO NON-USER: CPT | Performed by: PSYCHIATRY & NEUROLOGY

## 2024-06-20 RX ORDER — LITHIUM CARBONATE 300 MG
300 TABLET ORAL NIGHTLY
Qty: 31 TABLET | Refills: 1 | Status: SHIPPED | OUTPATIENT
Start: 2024-06-20 | End: 2024-06-20 | Stop reason: SDUPTHER

## 2024-06-20 RX ORDER — LITHIUM CARBONATE 300 MG
600 TABLET ORAL NIGHTLY
Qty: 62 TABLET | Refills: 1 | Status: SHIPPED | OUTPATIENT
Start: 2024-06-20

## 2024-06-20 ASSESSMENT — ENCOUNTER SYMPTOMS
AGITATION: 1
SLEEP DISTURBANCE: 0
APPETITE CHANGE: 0
HYPERACTIVE: 1
VOMITING: 0
CHOKING: 0
CONSTIPATION: 0
ACTIVITY CHANGE: 1
SEIZURES: 0
TREMORS: 1
TROUBLE SWALLOWING: 0
POLYDIPSIA: 0
DIARRHEA: 0

## 2024-06-20 NOTE — PROGRESS NOTES
Outpatient Psychiatry    A HIPAA-compliant interactive audio and video telecommunication system which permits real time communications between the patient (at the originating site) and provider (at the distant site) was utilized to provide this telehealth service.     The patient, family, caregivers and guardian (as appropriate) have provided consent on this date to conduct treatment via this telehealth service.  The patient's identity and physical location were verified at the time of this visit.      Present for appointment: Mom/guardian (Elizabeth) and [facility] management (Lee).    MONICA Salgado has continued to struggle.  He is having frequent episodes of impulsive physical aggression.      I spoke with Lee a few weeks ago and we started a trial of low-dose lozapine (5mg BID) which has not resulted in any improvement in behavioral concerns.    He saw Dr. Flynn for neurology follow-up on 6/12/24 and orders for updated labs were placed and Lee says staff plan to have these completed tomorrow or on Saturday.  Current ASMs: lacosamide 400mg/day, lamotrigine 400mg/day, topiramate 200mg/day.    Review of Systems   Constitutional:  Positive for activity change. Negative for appetite change.   HENT:  Negative for drooling and trouble swallowing.    Respiratory:  Negative for choking.    Gastrointestinal:  Negative for constipation, diarrhea and vomiting.   Endocrine: Negative for polydipsia and polyuria.   Genitourinary:  Negative for enuresis.   Musculoskeletal:  Positive for gait problem.   Neurological:  Positive for tremors. Negative for seizures and syncope.   Psychiatric/Behavioral:  Positive for agitation, behavioral problems and self-injury. Negative for sleep disturbance. The patient is hyperactive.       Controlled Substance Evaluation  OARRS/PDMP reviewed: No data recorded  Is the patient prescribed a combination of a benzodiazepine and opioid? No  I have personally reviewed the OARRS report for  Damian Martinez.   I have considered the risks of abuse, dependence, addiction and diversion.    I believe that it is clinically appropriate for Damian Martinez to be prescribed this medication.    Last Urine Drug Screen:  No results found for this or any previous visit (from the past 8760 hour(s)).  Clinical rationale for not completing a Urine Drug Screen: UDS Not Completed: Patient in managed care facility (nursing home, assisted living, other where patient does not self-administer medication)    Controlled Substance Agreement: 12/05/2023  Reviewed Controlled Substance Agreement, including but not limited to:  Benefits, risks, and alternatives to treatment with a controlled substance medication(s).    Prescribed Controlled Substances:  > Benzodiazepines: Lorazepam  What is the patient's goal of therapy? Agitation/anxiety  Is this being achieved with current treatment? Somewhat  Activities of Daily Living:   Is your overall impression that this patient is benefiting (symptom reduction outweighs side effects) from benzodiazepine therapy? Yes   1. Physical Functioning: Same  2. Family Relationship: Same  3. Social Relationship: Same  4. Mood: Same  5. Sleep Patterns: Same  6. Overall Function: Same     MEDICATION HISTORY  Atomoxetine  Buspirone  Clonazepam  Concerta  Depakote  Dexedrine  Fluoxetine  Haldol  Quetiapine  Risperidone  Sertraline  Ziprasidone    CURRENT MEDICATIONS    Current Outpatient Medications:     cloNIDine (Catapres) 0.1 mg tablet, Take 1 tablet (0.1 mg) by mouth 3 times a day., Disp: 93 tablet, Rfl: 1    docusate sodium (Colace) 100 mg capsule, Take 1 capsule (100 mg) by mouth once daily., Disp: , Rfl:     lacosamide (Vimpat) 100 mg tablet, TAKE ONE  AND ONE-HALF TABLET BY MOUTH IN THE MORNING  TAKE ONE TABLET MIDDAY  AND TAKE ONE AND ONE-HALF TABLET AT BEDTIME, Disp: , Rfl:     lamoTRIgine (LaMICtal) 100 mg tablet, Take 2 tablets (200 mg) by mouth 2 times a day., Disp: , Rfl:     LORazepam (Ativan) 1 mg  "tablet, Take 1 tablet (1 mg) by mouth 3 times a day., Disp: 93 tablet, Rfl: 1    loxapine (Loxitane) 5 mg capsule, Take 1 capsule (5 mg) by mouth 2 times a day., Disp: 62 capsule, Rfl: 0    nasal spray Nayzilam 5 mg/spray (0.1 mL) spray,non-aerosol, , Disp: , Rfl:     OLANZapine (ZyPREXA) 10 mg tablet, Take 1 tablet (10 mg) by mouth once daily in the morning., Disp: 31 tablet, Rfl: 1    OLANZapine (ZyPREXA) 5 mg tablet, Take 1 tablet (5 mg) by mouth twice daily - 12pm and bedtime., Disp: 62 tablet, Rfl: 1    OLANZapine zydis (ZyPREXA) 15 mg disintegrating tablet, Give 1 tablet by mouth ONCE daily as needed for symptoms of disruptive behavior disorder (severe SIB or aggression) that lasts > 15 minutes despite attempts to behaviorally redirect., Disp: 30 tablet, Rfl: 0    omeprazole (PriLOSEC) 20 mg DR capsule, Take 1 capsule (20 mg) by mouth once daily., Disp: , Rfl:     polyethylene glycol (Glycolax, Miralax) 17 gram/dose powder, Take 17 g by mouth once daily., Disp: , Rfl:     topiramate (Topamax) 100 mg tablet, Take 1 tablet (100 mg) by mouth 2 times a day., Disp: , Rfl:     SOCIAL HISTORY  Living situation [facility] with 2:1 staff   Provider agency [facility] as of October 2023   Work or day program None currently   School N/A   Guardianship Mother (Elizabeth)   SSA ?   Bx Specialist ?   Nicotine None   Alcohol None   Other drugs None     OBJECTIVE    No results found for: \"HGB\", \"PLT\", \"NEUTROABS\", \"GLUCOSE\", \"NA\", \"K\", \"CO2\", \"CALCIUM\", \"CREATININE\", \"AST\", \"ALT\", \"HGBA1C\", \"AMMONIA\", \"TSH\", \"FREET4\", \"CHOL\", \"LDLF\", \"TRIG\", \"UNOGMAWJ27\", \"VITD25\"    Therapeutic Drug Monitoring  03/26/2024: Lithium level = 0.5 (600mg/day)  12/11/2023: Lamotrigine level = 10.2 [1-13] (400mg/day)  12/11/2023: Lacosamide level = 9.7 [2.2-19.8] (400mg/day)  12/11/2023: Topiramate level = 7.7 [5-20] (200mg/day)  04/26/2023: Lamotrigine level = 16.7 [1-13] (400mg/day)  04/26/2023: Lacosamide level = 6.4 [2.2-19.8] " "(400mg/day)    Electrocardiograms  12/21/2022: SR, VR 67, QTc 419    MENTAL STATUS EXAM  Not able to assess at this visit    ASSESSMENT  Damian continues to have frequent episodes of impulsive physical aggression.  It does sound like there was some symptomatic improvement in aggression and overall mood when we had started the lithium.  Lithium was increased from 600mg/day to 900mg/day at our visit on 5/13/24.  I elected to discontinue the lithium on 5/20/24 due to concerns reported by the agency (increased tremors, sedation, falls/unsteadiness), though from Dr. Flynn's note it sounds like there is at least some possibility that these problems could have been unrelated to the lithium as they have historically been observed with inadequate seizure control.  Since the lithium did seem to offer some benefits I think it is worth re-trying while Dr. Flynn is assessing his ASM regimen.  He is at a \"steady state\" with his current regimen, so after the labs are drawn we plan to discontinue the loxapine and resume lithium at 600mg/day which he had previously tolerated well.  We will also have VS checked at home in case we need to reduce or adjust his clonidine dosing.  Though we discussed perhaps pursuing a trial of clozapine, I'm not sure we would have good success with obtaining weekly labs on Damian right now.  We will check in again in one month to reassess.    PROBLEM LIST  Intellectual developmental disorder, severe  ASD  Disruptive behavior  Anxiety  Epilepsy    PLAN  -- Discontinue loxapine  -- Resume lithium 600mg at bedtime for impulse control  -- Continue clonidine 0.1mg TID (AM, 12:00, HS) for impulsivity  -- Continue olanzapine 10mg - 5mg - 5mg (AM, 12:00, HS) for aggression and impulsivity  -- Continue lorazepam 1mg TID (AM, 12:00, HS) for anxiety  -- PRN: olanzapine ODT 15mg PO once daily  -- Follow up 1 month    Emil Boone MD    "

## 2024-06-20 NOTE — PATIENT INSTRUCTIONS
Discontinue loxapine and resume lithium 600mg at bedtime starting Saturday 6/22 or Sunday 6/23 (AFTER labs requested by Dr. Flynn are done).    Check BP/pulse readings twice daily for three days (before NOON dose of clonidine and before bedtime dose of clonidine).  Report readings back to Dr. Lundy for evaluation of clonidine dosing.    Next appointment: Friday 7/26/2024 at 8:00 AM joey

## 2024-07-24 DIAGNOSIS — F91.9 DISRUPTIVE BEHAVIOR DISORDER: ICD-10-CM

## 2024-07-24 DIAGNOSIS — F41.1 GAD (GENERALIZED ANXIETY DISORDER): ICD-10-CM

## 2024-07-26 ENCOUNTER — APPOINTMENT (OUTPATIENT)
Dept: BEHAVIORAL HEALTH | Facility: CLINIC | Age: 29
End: 2024-07-26
Payer: MEDICAID

## 2024-07-26 DIAGNOSIS — F91.9 DISRUPTIVE BEHAVIOR DISORDER: Primary | ICD-10-CM

## 2024-07-26 DIAGNOSIS — F84.0 AUTISTIC DISORDER (HHS-HCC): ICD-10-CM

## 2024-07-26 DIAGNOSIS — F41.1 GAD (GENERALIZED ANXIETY DISORDER): ICD-10-CM

## 2024-07-26 DIAGNOSIS — G40.309 NONINTRACTABLE GENERALIZED IDIOPATHIC EPILEPSY WITHOUT STATUS EPILEPTICUS (MULTI): ICD-10-CM

## 2024-07-26 DIAGNOSIS — F72 INTELLECTUAL DEVELOPMENTAL DISORDER, SEVERE: ICD-10-CM

## 2024-07-26 PROCEDURE — 99213 OFFICE O/P EST LOW 20 MIN: CPT | Performed by: PSYCHIATRY & NEUROLOGY

## 2024-07-26 NOTE — PROGRESS NOTES
Outpatient Adult IDD Psychiatry    A HIPAA-compliant interactive audio and video telecommunication system which permits real time communications between the patient (at the originating site) and provider (at the distant site) was utilized to provide this telehealth service.     The patient, family, caregivers and guardian (as appropriate) have provided consent on this date to conduct treatment via this telehealth service.  The patient's identity and physical location were verified at the time of this visit.      Present for appointment: Mom/guardian (Elizabeth).    SUBJECTIVE    Damian and agency staff did not join appointment.  Not sure what recent BP data is looking like.    Had labs done on 6/21/24 -- Dr. Flynn increased lamotrigine by 50mg/day as of 6/25/24.  Current ASMs: lacosamide 400mg/day, lamotrigine 450mg/day, topiramate 200mg/day.    Does not sound like he has had additional problems with lethargy, dizziness, or unsteadiness since resuming lithium.    Review of Systems   Gastrointestinal:  Negative for diarrhea and vomiting.   Endocrine: Negative for polydipsia and polyuria.   Neurological:  Negative for dizziness, seizures and syncope.   Psychiatric/Behavioral:  Positive for behavioral problems.       Controlled Substance Evaluation  OARRS/PDMP reviewed: Emil Boone MD on 7/26/2024  8:14 AM  Is the patient prescribed a combination of a benzodiazepine and opioid? No  I have personally reviewed the OARRS report for Damian Martinez.   I have considered the risks of abuse, dependence, addiction and diversion.    I believe that it is clinically appropriate for Damian Martinez to be prescribed this medication.    Last Urine Drug Screen:  No results found for this or any previous visit (from the past 8760 hour(s)).  Clinical rationale for not completing a Urine Drug Screen: UDS Not Completed: Patient in managed care facility (nursing home, assisted living, other where patient does not self-administer  medication)    Controlled Substance Agreement: 12/05/2023  Reviewed Controlled Substance Agreement, including but not limited to:  Benefits, risks, and alternatives to treatment with a controlled substance medication(s).    Prescribed Controlled Substances:  > Benzodiazepines: Lorazepam  What is the patient's goal of therapy? Agitation/anxiety  Is this being achieved with current treatment? Somewhat  Activities of Daily Living:   Is your overall impression that this patient is benefiting (symptom reduction outweighs side effects) from benzodiazepine therapy? Yes   1. Physical Functioning: Same  2. Family Relationship: Same  3. Social Relationship: Same  4. Mood: Same  5. Sleep Patterns: Same  6. Overall Function: Same     MEDICATION HISTORY  Atomoxetine  Buspirone  Clonazepam  Concerta  Depakote  Dexedrine  Fluoxetine  Haldol  Quetiapine  Risperidone  Sertraline  Ziprasidone    CURRENT MEDICATIONS    Current Outpatient Medications:     cloNIDine (Catapres) 0.1 mg tablet, Take 1 tablet (0.1 mg) by mouth 3 times a day., Disp: 93 tablet, Rfl: 1    docusate sodium (Colace) 100 mg capsule, Take 1 capsule (100 mg) by mouth once daily., Disp: , Rfl:     lacosamide (Vimpat) 100 mg tablet, TAKE ONE  AND ONE-HALF TABLET BY MOUTH IN THE MORNING  TAKE ONE TABLET MIDDAY  AND TAKE ONE AND ONE-HALF TABLET AT BEDTIME, Disp: , Rfl:     lamoTRIgine (LaMICtal) 100 mg tablet, Take 2 tablets (200 mg) by mouth 2 times a day., Disp: , Rfl:     lithium 300 mg tablet, Take 2 tablets (600 mg) by mouth once daily at bedtime., Disp: 62 tablet, Rfl: 1    LORazepam (Ativan) 1 mg tablet, Take 1 tablet (1 mg) by mouth 3 times a day., Disp: 93 tablet, Rfl: 1    nasal spray Nayzilam 5 mg/spray (0.1 mL) spray,non-aerosol, , Disp: , Rfl:     OLANZapine (ZyPREXA) 10 mg tablet, Take 1 tablet (10 mg) by mouth once daily in the morning., Disp: 31 tablet, Rfl: 1    OLANZapine (ZyPREXA) 5 mg tablet, Take 1 tablet (5 mg) by mouth twice daily - 12pm and  "bedtime., Disp: 62 tablet, Rfl: 1    OLANZapine zydis (ZyPREXA) 15 mg disintegrating tablet, Give 1 tablet by mouth ONCE daily as needed for symptoms of disruptive behavior disorder (severe SIB or aggression) that lasts > 15 minutes despite attempts to behaviorally redirect., Disp: 30 tablet, Rfl: 0    omeprazole (PriLOSEC) 20 mg DR capsule, Take 1 capsule (20 mg) by mouth once daily., Disp: , Rfl:     polyethylene glycol (Glycolax, Miralax) 17 gram/dose powder, Take 17 g by mouth once daily., Disp: , Rfl:     topiramate (Topamax) 100 mg tablet, Take 1 tablet (100 mg) by mouth 2 times a day., Disp: , Rfl:     SOCIAL HISTORY  Living situation [facility] with 2:1 staff   Provider agency [facility] as of October 2023   Work or day program None currently   School N/A   Guardianship Mother (Elizabeth)   SSA ?   Bx Specialist ?   Nicotine None   Alcohol None   Other drugs None     OBJECTIVE    No results found for: \"HGB\", \"PLT\", \"NEUTROABS\", \"GLUCOSE\", \"NA\", \"K\", \"CO2\", \"CALCIUM\", \"CREATININE\", \"AST\", \"ALT\", \"HGBA1C\", \"AMMONIA\", \"TSH\", \"FREET4\", \"CHOL\", \"LDLF\", \"TRIG\", \"BNXPMZRL27\", \"VITD25\"    Therapeutic Drug Monitoring  06/21/2024: Lamotrigine level = 10.0 [1-13] (400mg/day)  06/21/2024: Lacosamide level = 8.8 [2.2-19.8] (400mg/day)  06/21/2024: Topiramate level = 10.1 [5-20] (200mg/day)  03/26/2024: Lithium level = 0.5 (600mg/day)  12/11/2023: Lamotrigine level = 10.2 [1-13] (400mg/day)  12/11/2023: Lacosamide level = 9.7 [2.2-19.8] (400mg/day)  12/11/2023: Topiramate level = 7.7 [5-20] (200mg/day)  04/26/2023: Lamotrigine level = 16.7 [1-13] (400mg/day)  04/26/2023: Lacosamide level = 6.4 [2.2-19.8] (400mg/day)    Electrocardiograms  12/21/2022: SR, VR 67, QTc 419    MENTAL STATUS EXAM  Patient not present today    ASSESSMENT  May consider tapering off clonidine as lithium is increased.  Need some additional information from [facility] before deciding on any additional treatment changes.    PROBLEM " LIST  Intellectual developmental disorder, severe  ASD  Disruptive behavior  Anxiety  Epilepsy    PLAN  -- Continue lithium 600mg at bedtime for impulse control  -- Continue clonidine 0.1mg TID (AM, 12:00, HS) for impulsivity  -- Continue olanzapine 10mg - 5mg - 5mg (AM, 12:00, HS) for aggression and impulsivity  -- Continue lorazepam 1mg TID (AM, 12:00, HS) for anxiety  -- PRN: olanzapine ODT 15mg PO once daily  -- Follow up 2-3 months    Emil Boone MD

## 2024-07-29 RX ORDER — LORAZEPAM 1 MG/1
1 TABLET ORAL 3 TIMES DAILY
Qty: 93 TABLET | Refills: 5 | OUTPATIENT
Start: 2024-07-29

## 2024-07-29 RX ORDER — OLANZAPINE 10 MG/1
10 TABLET ORAL
Qty: 31 TABLET | Refills: 11 | OUTPATIENT
Start: 2024-07-29

## 2024-07-29 RX ORDER — LORAZEPAM 1 MG/1
1 TABLET ORAL 3 TIMES DAILY
Qty: 93 TABLET | Refills: 2 | Status: SHIPPED | OUTPATIENT
Start: 2024-07-29

## 2024-07-29 RX ORDER — CLONIDINE HYDROCHLORIDE 0.1 MG/1
0.1 TABLET ORAL 3 TIMES DAILY
Qty: 22 TABLET | Refills: 11 | OUTPATIENT
Start: 2024-07-29

## 2024-07-29 RX ORDER — OLANZAPINE 5 MG/1
TABLET ORAL
Qty: 62 TABLET | Refills: 11 | OUTPATIENT
Start: 2024-07-29

## 2024-07-29 RX ORDER — CLONIDINE HYDROCHLORIDE 0.1 MG/1
0.1 TABLET ORAL 3 TIMES DAILY
Qty: 93 TABLET | Refills: 3 | Status: SHIPPED | OUTPATIENT
Start: 2024-07-29

## 2024-07-29 RX ORDER — OLANZAPINE 5 MG/1
TABLET ORAL
Qty: 62 TABLET | Refills: 3 | Status: SHIPPED | OUTPATIENT
Start: 2024-07-29

## 2024-07-29 RX ORDER — OLANZAPINE 10 MG/1
10 TABLET ORAL EVERY MORNING
Qty: 31 TABLET | Refills: 3 | Status: SHIPPED | OUTPATIENT
Start: 2024-07-29

## 2024-07-29 RX ORDER — LITHIUM CARBONATE 300 MG
600 TABLET ORAL NIGHTLY
Qty: 62 TABLET | Refills: 3 | Status: SHIPPED | OUTPATIENT
Start: 2024-07-29

## 2024-07-29 RX ORDER — LITHIUM CARBONATE 600 MG/1
600 CAPSULE ORAL NIGHTLY
Qty: 22 CAPSULE | Refills: 11 | OUTPATIENT
Start: 2024-07-29

## 2024-07-29 ASSESSMENT — ENCOUNTER SYMPTOMS
VOMITING: 0
SEIZURES: 0
DIARRHEA: 0
POLYDIPSIA: 0
DIZZINESS: 0

## 2024-08-13 DIAGNOSIS — F91.9 DISRUPTIVE BEHAVIOR DISORDER: ICD-10-CM

## 2024-08-13 DIAGNOSIS — F84.0 AUTISTIC DISORDER (HHS-HCC): ICD-10-CM

## 2024-08-13 RX ORDER — CLONIDINE HYDROCHLORIDE 0.1 MG/1
0.1 TABLET, EXTENDED RELEASE ORAL 2 TIMES DAILY
Qty: 62 TABLET | Refills: 1 | Status: SHIPPED | OUTPATIENT
Start: 2024-08-13

## 2024-08-13 NOTE — PROGRESS NOTES
"Update email from mom:    He was taken to the emergency room for stitches on 8/11 because he fell and hit the corner of his eye.   He is doing good but my concern is we don't know why he fell and this is the second time has fallen and gotten hurt. The first did not need medical  treatment.  A little background from what I have seen:    Two weeks ago, I stopped by and he was sleeping, he woke up but was really unsteady. I was told this is often the case when he first wakes up. I took him out for a drive but avoided stopping because he was very \"shaky\" and was having a difficult time of walking.  His gait was off and he stopped every couple seconds and told me he was shaky. The 2 steps out of his house and getting him into the car was difficult.  He has had this kind of behavior in the past and just like before it is off and on.      On Saturday, I picked him up and he was good.  We walked into the store and restaurant with little to no issues. On Sunday, I was told he woke up from his nap and went to the bathroom, he went back into his room and was hitting the walls.  Staff heard a loud noise and went to investigate and he was sitting on his bed with blood all over his face from the cut above his eye.   We do not know what he hit or why he fell.      I have not noticed any seizure activity when I have been with him.     -- Change/decrease clonidine to ER 0.1mg twice daily (morning and bedtime).   "

## 2024-10-07 ENCOUNTER — APPOINTMENT (OUTPATIENT)
Dept: BEHAVIORAL HEALTH | Facility: CLINIC | Age: 29
End: 2024-10-07
Payer: MEDICAID

## 2024-10-07 VITALS
SYSTOLIC BLOOD PRESSURE: 124 MMHG | HEART RATE: 77 BPM | DIASTOLIC BLOOD PRESSURE: 74 MMHG | RESPIRATION RATE: 18 BRPM | TEMPERATURE: 97.9 F

## 2024-10-07 DIAGNOSIS — G40.309 NONINTRACTABLE GENERALIZED IDIOPATHIC EPILEPSY WITHOUT STATUS EPILEPTICUS (MULTI): ICD-10-CM

## 2024-10-07 DIAGNOSIS — F91.9 DISRUPTIVE BEHAVIOR DISORDER: Primary | ICD-10-CM

## 2024-10-07 DIAGNOSIS — F84.0 AUTISTIC DISORDER (HHS-HCC): ICD-10-CM

## 2024-10-07 DIAGNOSIS — F41.1 GAD (GENERALIZED ANXIETY DISORDER): ICD-10-CM

## 2024-10-07 DIAGNOSIS — F72 INTELLECTUAL DEVELOPMENTAL DISORDER, SEVERE: ICD-10-CM

## 2024-10-07 PROCEDURE — 99215 OFFICE O/P EST HI 40 MIN: CPT | Performed by: PSYCHIATRY & NEUROLOGY

## 2024-10-07 PROCEDURE — 1036F TOBACCO NON-USER: CPT | Performed by: PSYCHIATRY & NEUROLOGY

## 2024-10-07 RX ORDER — CLONIDINE HYDROCHLORIDE 0.1 MG/1
0.1 TABLET, EXTENDED RELEASE ORAL EVERY MORNING
Qty: 31 TABLET | Refills: 0 | Status: SHIPPED | OUTPATIENT
Start: 2024-10-07

## 2024-10-07 RX ORDER — LACOSAMIDE 150 MG/1
150 TABLET ORAL 3 TIMES DAILY
COMMUNITY
Start: 2024-09-09

## 2024-10-07 RX ORDER — OLANZAPINE 5 MG/1
TABLET ORAL
Qty: 62 TABLET | Refills: 2 | Status: SHIPPED | OUTPATIENT
Start: 2024-10-07

## 2024-10-07 RX ORDER — LORAZEPAM 1 MG/1
1 TABLET ORAL 3 TIMES DAILY
Qty: 93 TABLET | Refills: 2 | Status: SHIPPED | OUTPATIENT
Start: 2024-10-07

## 2024-10-07 RX ORDER — LAMOTRIGINE 100 MG/1
TABLET ORAL
Qty: 135 TABLET | Refills: 0 | Status: SHIPPED | OUTPATIENT
Start: 2024-10-07

## 2024-10-07 RX ORDER — OLANZAPINE 10 MG/1
10 TABLET ORAL EVERY MORNING
Qty: 31 TABLET | Refills: 2 | Status: SHIPPED | OUTPATIENT
Start: 2024-10-07

## 2024-10-07 RX ORDER — LITHIUM CARBONATE 300 MG
600 TABLET ORAL NIGHTLY
Qty: 62 TABLET | Refills: 2 | Status: SHIPPED | OUTPATIENT
Start: 2024-10-07

## 2024-10-07 ASSESSMENT — ENCOUNTER SYMPTOMS
TREMORS: 1
VOMITING: 0
SEIZURES: 0
POLYDIPSIA: 0
CHOKING: 0
DIZZINESS: 0
APPETITE CHANGE: 0
NAUSEA: 0
ACTIVITY CHANGE: 0
COUGH: 0
DIARRHEA: 0

## 2024-10-07 ASSESSMENT — PAIN SCALES - GENERAL: PAINLEVEL: 0-NO PAIN

## 2024-10-07 NOTE — PROGRESS NOTES
Outpatient Adult IDD Psychiatry    Present for appointment: Damian, mom/guardian (Elizabeth), and agency staff (Nicho).    SUBJECTIVE    Damian has not had any significant health problems over the past few months.    He continues to act out aggressively towards others or to himself when frustrated, and still seems to be impatient with low frustration tolerance.  He loves going for rides on the highway to see semi-trucks, and often gets upset and angry when they have to head home or if he sees they are going somewhere he is familiar with but does not like.    It sounds like there has been some ongoing use of as-needed olanzapine in the past few months.  He apparently received two doses last week, but it is difficult to fully account for the months of August and September.  As before, the as-needed medication often provides minimal reduction in aggression/agitation.    Though he has not had any major breakthrough seizures, he continues to have tremors and gait instability.  His current ASMs are as follows: lacosamide 400mg/day, lamotrigine 450mg/day, and topiramate 200mg/day.    Review of Systems   Constitutional:  Negative for activity change and appetite change.   Respiratory:  Negative for cough and choking.    Gastrointestinal:  Negative for diarrhea, nausea and vomiting.   Endocrine: Negative for polydipsia and polyuria.   Genitourinary:  Positive for enuresis (occasional nocturia).   Musculoskeletal:  Positive for gait problem.   Neurological:  Positive for tremors. Negative for dizziness, seizures and syncope.   Psychiatric/Behavioral:  Positive for behavioral problems.       Controlled Substance Evaluation  OARRS/PDMP reviewed: Emil Boone MD on 10/7/2024  8:52 AM  Is the patient prescribed a combination of a benzodiazepine and opioid? No  I have personally reviewed the OARRS report for Damian Martinez.   I have considered the risks of abuse, dependence, addiction and diversion.    I believe that it is  clinically appropriate for Damian Martinez to be prescribed this medication.    Last Urine Drug Screen: Unable to complete  No results found for this or any previous visit (from the past 8760 hour(s)).    Controlled Substance Agreement: 10/07/2024  Reviewed Controlled Substance Agreement, including but not limited to:  Benefits, risks, and alternatives to treatment with a controlled substance medication(s).    Prescribed Controlled Substances:  > Benzodiazepines: Lorazepam  What is the patient's goal of therapy? Agitation/anxiety  Is this being achieved with current treatment? Somewhat  Activities of Daily Living:   Is your overall impression that this patient is benefiting (symptom reduction outweighs side effects) from benzodiazepine therapy? Yes   1. Physical Functioning: Same  2. Family Relationship: Same  3. Social Relationship: Same  4. Mood: Same  5. Sleep Patterns: Same  6. Overall Function: Same     MEDICATION HISTORY  Atomoxetine  Buspirone  Clonazepam  Concerta  Depakote  Dexedrine  Fluoxetine  Haldol  Quetiapine  Risperidone  Sertraline  Ziprasidone    CURRENT MEDICATIONS    Current Outpatient Medications:     lacosamide (Vimpat) 150 mg tablet tablet, Take 1 tablet (150 mg) by mouth 3 times a day., Disp: , Rfl:     cloNIDine ER (Kapvay) 0.1 mg tablet extended release 12 hr, Take 1 tablet (0.1 mg) by mouth 2 times a day., Disp: 62 tablet, Rfl: 1    docusate sodium (Colace) 100 mg capsule, Take 1 capsule (100 mg) by mouth once daily., Disp: , Rfl:     lamoTRIgine (LaMICtal) 100 mg tablet, Take 2 tablets (200 mg) by mouth 2 times a day., Disp: , Rfl:     lithium 300 mg tablet, Take 2 tablets (600 mg) by mouth once daily at bedtime., Disp: 62 tablet, Rfl: 3    LORazepam (Ativan) 1 mg tablet, Take 1 tablet (1 mg) by mouth 3 times a day., Disp: 93 tablet, Rfl: 2    nasal spray Nayzilam 5 mg/spray (0.1 mL) spray,non-aerosol, , Disp: , Rfl:     OLANZapine (ZyPREXA) 10 mg tablet, Take 1 tablet (10 mg) by mouth once  daily in the morning., Disp: 31 tablet, Rfl: 3    OLANZapine (ZyPREXA) 5 mg tablet, Take 1 tablet (5 mg) by mouth twice daily - 12pm and bedtime., Disp: 62 tablet, Rfl: 3    OLANZapine zydis (ZyPREXA) 15 mg disintegrating tablet, Give 1 tablet by mouth ONCE daily as needed for symptoms of disruptive behavior disorder (severe SIB or aggression) that lasts > 15 minutes despite attempts to behaviorally redirect., Disp: 30 tablet, Rfl: 0    omeprazole (PriLOSEC) 20 mg DR capsule, Take 1 capsule (20 mg) by mouth once daily., Disp: , Rfl:     polyethylene glycol (Glycolax, Miralax) 17 gram/dose powder, Take 17 g by mouth once daily., Disp: , Rfl:     topiramate (Topamax) 100 mg tablet, Take 1 tablet (100 mg) by mouth 2 times a day., Disp: , Rfl:     SOCIAL HISTORY  Living situation [facility] home with 2:1 staff   Provider agency [facility] as of October 2023   Work or day program None currently   School N/A   Guardianship Mother (Elizabeth)   SSA ?   Bx Specialist ?   Nicotine None   Alcohol None   Other drugs None     OBJECTIVE    Visit Vitals  /74   Pulse 77   Temp 36.6 °C (97.9 °F) (Temporal)   Resp 18   Smoking Status Never     Lab Results   Component Value Date    HGBA1C 4.8 09/30/2024     Therapeutic Drug Monitoring  06/21/2024: Lamotrigine level = 10.0 [1-13] (400mg/day)  06/21/2024: Lacosamide level = 8.8 [2.2-19.8] (400mg/day)  06/21/2024: Topiramate level = 10.1 [5-20] (200mg/day)  03/26/2024: Lithium level = 0.5 (600mg/day)  12/11/2023: Lamotrigine level = 10.2 [1-13] (400mg/day)  12/11/2023: Lacosamide level = 9.7 [2.2-19.8] (400mg/day)  12/11/2023: Topiramate level = 7.7 [5-20] (200mg/day)  04/26/2023: Lamotrigine level = 16.7 [1-13] (400mg/day)  04/26/2023: Lacosamide level = 6.4 [2.2-19.8] (400mg/day)    Electrocardiograms  12/21/2022: SR, VR 67, QTc 419    MENTAL STATUS EXAM  General/Appearance: Poor or minimal attention to dress/hygiene/grooming.  Attitude/Behavior: Difficult to engage. Poor/limited  eye contact. Superficially cooperative. Does not want to participate. Resistant to coming into the office. Asks to leave several times.  Speech/Communication: Scripting.  Motor: Tremors.  Gait: Ataxic. Unsteady.  Mood:  Some periods of being irritated.  Affect: Congruent. Dysphoric.  Thought processes: Perseverative.  Thought content: Talks about wanting to see trucks on the highway.  Perception: Does not appear to be experiencing or responding to hallucinations.  Sensorium/Cognition: Oriented to self only. Intellectual impairment. Minimal interest in participating.  Memory: Not directly assessed at this time.  Insight: Absent.  Judgment: Poor. Redirectable.    ASSESSMENT  I would like to further increase lithium, if possible, to address mood and aggression.  We will gradually taper off clonidine in the event that it is contributing to unsteadiness/imbalance.    PROBLEM LIST  Intellectual developmental disorder, severe  ASD  Disruptive behavior  Anxiety  Epilepsy    PLAN  -- Decrease clonidine ER to 0.1mg QAM for impulsivity   -- Continue lithium 600mg at bedtime for impulse control  -- Continue olanzapine 10mg - 5mg - 5mg (AM, 12:00, HS) for aggression and impulsivity  -- Continue lorazepam 1mg TID (AM, 12:00, HS) for anxiety  -- PRN: olanzapine ODT 15mg PO once daily  -- Check labs (lithium level, lamotrigine level, lacosamide level)  -- Follow up 2 months    Emil Boone MD    Prep time on date of the patient encounter: 5 minutes   Time spent directly with patient/family/caregiver: 35 minutes   Additional time spent on patient care activities: 0 minutes   Documentation time: 10 minutes   Other time spent: 0 minutes   Total time on date of patient encounter: 50 minutes

## 2024-10-07 NOTE — PATIENT INSTRUCTIONS
Check updated labs (lithium level, lamotrigine level, lacosamide level).    DECREASE clonidine ER to 0.1mg once daily (morning only).    Next appointment: Wednesday 12/04/24 at 9:00 AM virtual.

## 2024-10-31 DIAGNOSIS — F91.9 DISRUPTIVE BEHAVIOR DISORDER: ICD-10-CM

## 2024-10-31 DIAGNOSIS — F84.0 AUTISTIC DISORDER (HHS-HCC): ICD-10-CM

## 2024-11-01 ENCOUNTER — PATIENT MESSAGE (OUTPATIENT)
Dept: BEHAVIORAL HEALTH | Facility: CLINIC | Age: 29
End: 2024-11-01
Payer: MEDICAID

## 2024-11-04 RX ORDER — CLONIDINE HYDROCHLORIDE 0.1 MG/1
0.1 TABLET, EXTENDED RELEASE ORAL 2 TIMES DAILY
Qty: 62 TABLET | Refills: 1 | Status: SHIPPED | OUTPATIENT
Start: 2024-11-04

## 2024-11-04 NOTE — TELEPHONE ENCOUNTER
Will increase clonidine ER back to 0.1mg BID pending labs -- may be able to try tapering off again if we can increase lithium.   Show Spray Paint Technique Variable?: Yes Render Post-Care Instructions In Note?: no Medical Necessity Information: It is in your best interest to select a reason for this procedure from the list below. All of these items fulfill various CMS LCD requirements except the new and changing color options. Medical Necessity Clause: This procedure was medically necessary because the lesions that were treated were: Post-Care Instructions: I reviewed with the patient in detail post-care instructions. Patient is to wear sunprotection, and avoid picking at any of the treated lesions. Pt may apply Vaseline to crusted or scabbing areas. Spray Paint Text: The liquid nitrogen was applied to the skin utilizing a spray paint frosting technique. Consent: The patient's consent was obtained including but not limited to risks of crusting, scabbing, blistering, scarring, darker or lighter pigmentary change, recurrence, incomplete removal and infection. Detail Level: Detailed

## 2024-11-09 DIAGNOSIS — F91.9 DISRUPTIVE BEHAVIOR DISORDER: ICD-10-CM

## 2024-11-13 RX ORDER — OLANZAPINE 15 MG/1
TABLET, ORALLY DISINTEGRATING ORAL
Qty: 30 TABLET | Refills: 1 | Status: SHIPPED | OUTPATIENT
Start: 2024-11-13

## 2024-11-15 ENCOUNTER — TELEPHONE (OUTPATIENT)
Dept: BEHAVIORAL HEALTH | Facility: CLINIC | Age: 29
End: 2024-11-15
Payer: MEDICAID

## 2024-11-27 ENCOUNTER — APPOINTMENT (OUTPATIENT)
Dept: BEHAVIORAL HEALTH | Facility: CLINIC | Age: 29
End: 2024-11-27
Payer: MEDICAID

## 2024-11-27 DIAGNOSIS — F41.1 GAD (GENERALIZED ANXIETY DISORDER): ICD-10-CM

## 2024-11-27 DIAGNOSIS — F84.0 AUTISTIC DISORDER (HHS-HCC): ICD-10-CM

## 2024-11-27 DIAGNOSIS — F72 INTELLECTUAL DEVELOPMENTAL DISORDER, SEVERE: ICD-10-CM

## 2024-11-27 DIAGNOSIS — F91.9 DISRUPTIVE BEHAVIOR DISORDER: Primary | ICD-10-CM

## 2024-11-27 DIAGNOSIS — G40.309 NONINTRACTABLE GENERALIZED IDIOPATHIC EPILEPSY WITHOUT STATUS EPILEPTICUS (MULTI): ICD-10-CM

## 2024-11-27 PROCEDURE — 1036F TOBACCO NON-USER: CPT | Performed by: PSYCHIATRY & NEUROLOGY

## 2024-11-27 PROCEDURE — 99214 OFFICE O/P EST MOD 30 MIN: CPT | Performed by: PSYCHIATRY & NEUROLOGY

## 2024-11-27 RX ORDER — OLANZAPINE 10 MG/1
10 TABLET ORAL EVERY MORNING
Qty: 31 TABLET | Refills: 2 | Status: SHIPPED | OUTPATIENT
Start: 2024-11-27

## 2024-11-27 RX ORDER — LITHIUM CARBONATE 300 MG
600 TABLET ORAL NIGHTLY
Qty: 62 TABLET | Refills: 2 | Status: SHIPPED | OUTPATIENT
Start: 2024-11-27

## 2024-11-27 RX ORDER — OLANZAPINE 5 MG/1
TABLET ORAL
Qty: 62 TABLET | Refills: 2 | Status: SHIPPED | OUTPATIENT
Start: 2024-11-27

## 2024-11-27 RX ORDER — LORAZEPAM 1 MG/1
1 TABLET ORAL 3 TIMES DAILY
Qty: 93 TABLET | Refills: 2 | Status: SHIPPED | OUTPATIENT
Start: 2024-11-27

## 2024-11-27 RX ORDER — CLONIDINE HYDROCHLORIDE 0.1 MG/1
0.1 TABLET, EXTENDED RELEASE ORAL 2 TIMES DAILY
Qty: 62 TABLET | Refills: 2 | Status: SHIPPED | OUTPATIENT
Start: 2024-11-27

## 2024-11-27 ASSESSMENT — ENCOUNTER SYMPTOMS
DIARRHEA: 0
NAUSEA: 0
SEIZURES: 0
DIZZINESS: 0
TREMORS: 1
COUGH: 0
POLYDIPSIA: 0
ACTIVITY CHANGE: 0
VOMITING: 0
APPETITE CHANGE: 0
CHOKING: 0

## 2024-11-27 NOTE — PROGRESS NOTES
"Outpatient Adult IDD Psychiatry    A HIPAA-compliant interactive audio and video telecommunication system which permits real time communications between the patient (at the originating site) and provider (at the distant site) was utilized to provide this telehealth service.     The patient, family, caregivers and guardian (as appropriate) have provided consent to conduct treatment via this telehealth service.      The patient's identity and physical location were verified at the time of this visit.     Present for appointment: Damian, mom/guardian (Elizabeth), and agency  ([employer]).     Appointment location: Home.  [address] [place] [address] [address] [place] [address]     SUBJECTIVE    Damian has not had any significant health problems since we last met in the office on 10/07/24.    His gait/balance has improved significantly since the additional/temporary increase of lorazepam was discontinued by Dr. Flynn.    He has not had any breakthrough seizures.  His current ASMs are as follows: lacosamide 450mg/day, lamotrigine 450mg/day, and topiramate 200mg/day.    He is thought to be improved, behaviorally, since increasing the extended-release clonidine back to 0.1mg twice daily.  Staff feel like he is not as impulsive or reactive with anger or agitation.  He has been able to go on at least one car ride without the physical/plexiglas divider between the front and back seat.    PRN medication (olanzapine) was used 4 times in November, with 3 of those 4 instances being marked as \"effective\" by staff.    Sleep patterns have remained stable.  No other concerns about appetite/eating or GI problems.    Review of Systems   Constitutional:  Negative for activity change and appetite change.   Respiratory:  Negative for cough and choking.    Gastrointestinal:  Negative for diarrhea, nausea and vomiting.   Endocrine: Negative for polydipsia and polyuria.   Musculoskeletal:  Negative for gait problem.   Neurological:  Positive for " tremors. Negative for dizziness, seizures and syncope.   Psychiatric/Behavioral:  Positive for behavioral problems.       Controlled Substance Evaluation  OARRS/PDMP reviewed: Emil Boone MD on 11/27/2024 11:18 AM  Is the patient prescribed a combination of a benzodiazepine and opioid? No  I have personally reviewed the OARRS report for Damian Martinez.   I have considered the risks of abuse, dependence, addiction and diversion.    I believe that it is clinically appropriate for Damian Martinez to be prescribed this medication.    Last Urine Drug Screen: Unable to complete  No results found for this or any previous visit (from the past 8760 hours).    Controlled Substance Agreement: 10/07/2024  Reviewed Controlled Substance Agreement, including but not limited to:  Benefits, risks, and alternatives to treatment with a controlled substance medication(s).    Prescribed Controlled Substances:  > Benzodiazepines: Lorazepam  What is the patient's goal of therapy? Agitation/anxiety  Is this being achieved with current treatment? Somewhat  Activities of Daily Living:   Is your overall impression that this patient is benefiting (symptom reduction outweighs side effects) from benzodiazepine therapy? Yes   1. Physical Functioning: Same  2. Family Relationship: Same  3. Social Relationship: Same  4. Mood: Same  5. Sleep Patterns: Same  6. Overall Function: Same     MEDICATION HISTORY  Atomoxetine  Buspirone  Clonazepam  Concerta  Depakote  Dexedrine  Fluoxetine  Haldol  Quetiapine  Risperidone  Sertraline  Ziprasidone    CURRENT MEDICATIONS    Current Outpatient Medications:     cloNIDine ER (Kapvay) 0.1 mg tablet extended release 12 hr, Take 1 tablet (0.1 mg) by mouth 2 times a day., Disp: 62 tablet, Rfl: 1    docusate sodium (Colace) 100 mg capsule, Take 1 capsule (100 mg) by mouth once daily., Disp: , Rfl:     lacosamide (Vimpat) 150 mg tablet tablet, Take 1 tablet (150 mg) by mouth 3 times a day., Disp: , Rfl:      lamoTRIgine (LaMICtal) 100 mg tablet, 200mg QAM and 250mg QHS, Disp: 135 tablet, Rfl: 0    lithium 300 mg tablet, Take 2 tablets (600 mg) by mouth once daily at bedtime., Disp: 62 tablet, Rfl: 2    LORazepam (Ativan) 1 mg tablet, Take 1 tablet (1 mg) by mouth 3 times a day., Disp: 93 tablet, Rfl: 2    nasal spray Nayzilam 5 mg/spray (0.1 mL) spray,non-aerosol, , Disp: , Rfl:     OLANZapine (ZyPREXA) 10 mg tablet, Take 1 tablet (10 mg) by mouth once daily in the morning., Disp: 31 tablet, Rfl: 2    OLANZapine (ZyPREXA) 5 mg tablet, Take 1 tablet (5 mg) by mouth twice daily - 12pm and bedtime., Disp: 62 tablet, Rfl: 2    OLANZapine zydis (ZyPREXA) 15 mg disintegrating tablet, TAKE 1 TABLET BY MOUTH ONCE DAILY AS NEEDED FOR SYMPTOMS OF DISRUPTIVE BEHAVIOR DISORDER (SEVERE SIB OR AGGRESSION) THAT LASTS > 15 MINUTES DESPITE ATTEMPTS TO BEHAVIORALLY REDIRECT, Disp: 30 tablet, Rfl: 1    omeprazole (PriLOSEC) 20 mg DR capsule, Take 1 capsule (20 mg) by mouth once daily., Disp: , Rfl:     polyethylene glycol (Glycolax, Miralax) 17 gram/dose powder, Take 17 g by mouth once daily., Disp: , Rfl:     topiramate (Topamax) 100 mg tablet, Take 1 tablet (100 mg) by mouth 2 times a day., Disp: , Rfl:     SOCIAL HISTORY  Living situation Waiver home with 2:1 staff   Provider agency [facility] as of October 2023   Work or day program None currently   School N/A   Guardianship Mother (Elizabeth)   SSA ?   Bx Specialist ?   Nicotine None   Alcohol None   Other drugs None     OBJECTIVE    Therapeutic Drug Monitoring  06/21/2024: Lamotrigine level = 10.0 [1-13] (400mg/day)  06/21/2024: Lacosamide level = 8.8 [2.2-19.8] (400mg/day)  06/21/2024: Topiramate level = 10.1 [5-20] (200mg/day)  03/26/2024: Lithium level = 0.5 (600mg/day)  12/11/2023: Lamotrigine level = 10.2 [1-13] (400mg/day)  12/11/2023: Lacosamide level = 9.7 [2.2-19.8] (400mg/day)  12/11/2023: Topiramate level = 7.7 [5-20] (200mg/day)  04/26/2023: Lamotrigine level = 16.7  [1-13] (400mg/day)  04/26/2023: Lacosamide level = 6.4 [2.2-19.8] (400mg/day)    Electrocardiograms  12/21/2022: SR, VR 67, QTc 419    MENTAL STATUS EXAM  General/Appearance: Poor or minimal attention to dress/hygiene/grooming.  Attitude/Behavior: Average eye contact. Superficially cooperative.  Speech/Communication: Scripting.  Motor:  Difficult to adequately assess for tremors today.  Gait: Not assessed at this visit.  Mood: Calm/pleasant.  Affect:  Seems happy. Smiles/laughs when he sees mom on video.  Thought processes: Perseverative.  Thought content: Talks about going for a ride today.  Perception: Does not appear to be experiencing or responding to hallucinations.  Sensorium/Cognition: Oriented to self only. Intellectual impairment. Minimal interest in participating.  Memory: Not directly assessed at this time.  Insight: Absent.  Judgment: Poor. Redirectable.    ASSESSMENT  Damian is showing some improved control of agitation/aggression and impulsivity with the extended-release clonidine.  Might consider further increasing the dose of this medication if warranted.  Still needs to get updated labs done.    PROBLEM LIST  Intellectual developmental disorder, severe  ASD  Disruptive behavior  Anxiety  Epilepsy    PLAN  -- Continue clonidine ER 0.1mg BID for impulsivity   -- Continue lithium 600mg at bedtime for impulse control  -- Continue olanzapine 10mg - 5mg - 5mg (AM, 12:00, HS) for aggression and impulsivity  -- Continue lorazepam 1mg TID (AM, 12:00, HS) for anxiety  -- PRN: olanzapine ODT 15mg PO once daily  -- Follow up ~ 8 weeks    Emil Boone MD    Prep time on date of the patient encounter: 0 minutes   Time spent directly with patient/family/caregiver: 30 minutes   Additional time spent on patient care activities: 0 minutes   Documentation time: 5 minutes   Other time spent: 0 minutes   Total time on date of patient encounter: 35 minutes

## 2024-11-27 NOTE — PATIENT INSTRUCTIONS
Damian has had some noted improvements in impulsive/disruptive/aggressive behaviors.  Seems to be tolerating extended-release clonidine well.  Could consider increasing dose of clonidine again if needed.  He is due for some updated labs.    > Orders provided for labs (lithium, lamotrigine, lacosamide).    Next appointment: Friday 1/17/25 at 11:15 AM virtual.

## 2024-12-04 ENCOUNTER — APPOINTMENT (OUTPATIENT)
Dept: BEHAVIORAL HEALTH | Facility: CLINIC | Age: 29
End: 2024-12-04
Payer: MEDICAID

## 2025-01-17 ENCOUNTER — APPOINTMENT (OUTPATIENT)
Dept: BEHAVIORAL HEALTH | Facility: CLINIC | Age: 30
End: 2025-01-17
Payer: MEDICAID

## 2025-01-17 DIAGNOSIS — F72 INTELLECTUAL DEVELOPMENTAL DISORDER, SEVERE: ICD-10-CM

## 2025-01-17 DIAGNOSIS — G40.309 NONINTRACTABLE GENERALIZED IDIOPATHIC EPILEPSY WITHOUT STATUS EPILEPTICUS (MULTI): ICD-10-CM

## 2025-01-17 DIAGNOSIS — F84.0 AUTISTIC DISORDER (HHS-HCC): ICD-10-CM

## 2025-01-17 DIAGNOSIS — F41.1 GAD (GENERALIZED ANXIETY DISORDER): ICD-10-CM

## 2025-01-17 DIAGNOSIS — F91.9 DISRUPTIVE BEHAVIOR DISORDER: Primary | ICD-10-CM

## 2025-01-17 PROCEDURE — 1036F TOBACCO NON-USER: CPT | Performed by: PSYCHIATRY & NEUROLOGY

## 2025-01-17 PROCEDURE — 99214 OFFICE O/P EST MOD 30 MIN: CPT | Performed by: PSYCHIATRY & NEUROLOGY

## 2025-01-17 RX ORDER — OLANZAPINE 5 MG/1
TABLET ORAL
Qty: 62 TABLET | Refills: 11 | Status: SHIPPED | OUTPATIENT
Start: 2025-01-17

## 2025-01-17 RX ORDER — LORAZEPAM 1 MG/1
1 TABLET ORAL 3 TIMES DAILY
Qty: 93 TABLET | Refills: 2 | Status: SHIPPED | OUTPATIENT
Start: 2025-01-17

## 2025-01-17 RX ORDER — OLANZAPINE 10 MG/1
10 TABLET ORAL EVERY MORNING
Qty: 31 TABLET | Refills: 11 | Status: SHIPPED | OUTPATIENT
Start: 2025-01-17

## 2025-01-17 RX ORDER — LITHIUM CARBONATE 300 MG
600 TABLET ORAL NIGHTLY
Qty: 62 TABLET | Refills: 11 | Status: SHIPPED | OUTPATIENT
Start: 2025-01-17

## 2025-01-17 RX ORDER — CLONIDINE HYDROCHLORIDE 0.1 MG/1
0.1 TABLET, EXTENDED RELEASE ORAL 2 TIMES DAILY
Qty: 62 TABLET | Refills: 11 | Status: SHIPPED | OUTPATIENT
Start: 2025-01-17

## 2025-01-17 ASSESSMENT — ENCOUNTER SYMPTOMS
CHOKING: 0
ACTIVITY CHANGE: 0
COUGH: 0
NAUSEA: 0
DIZZINESS: 0
APPETITE CHANGE: 0
VOMITING: 0
TREMORS: 1
DIARRHEA: 0
SEIZURES: 0
POLYDIPSIA: 0

## 2025-01-17 NOTE — PATIENT INSTRUCTIONS
Continue current treatments at this time.  Will follow-up again with Damian after he sees [facility] (2/14) and Neurology (2/19).  Next appointment: Friday 3/14/25 at 10:30 AM virtual

## 2025-01-17 NOTE — PROGRESS NOTES
"Outpatient Adult IDD Psychiatry    A HIPAA-compliant interactive audio and video telecommunication system which permits real time communications between the patient (at the originating site) and provider (at the distant site) was utilized to provide this telehealth service.     The patient, family, caregivers and guardian (as appropriate) have provided consent to conduct treatment via this telehealth service.      The patient's identity and physical location were verified at the time of this visit.     Present for appointment: Damian, mom/guardian (Elizabeth), agency  ([employer]), and house staff (Malika).     Appointment location: Home.  [address] [place] [address] Dr [place] [address]     SUBJECTIVE    No new health concerns for Damian since we last met.    Reviewed labs done 12/06/24.    Established care with Dr. Rodriguez at [facility] this week.  Has follow-up planned for 2/14/25.    Behaviorally doing pretty well.  Still often quick to get frustrated/impatient when he has to wait for things, especially access to food and going for car rides on the highway.      Use of the physical/plexiglas divider between the front and back seat is still part of his ISP for riding in the car.    He received his PRN olanzapine zero times in December and twice in January (both of which were recorded as \"effective\").    He has not had any breakthrough seizures.  His current ASMs are as follows: lacosamide 450mg/day, lamotrigine 450mg/day, and topiramate 200mg/day.  Next [facility]    His gait/balance has been stable, though he does get anxious when having to walk on wet or snow-covered surfaces.    Sleep patterns have remained stable.  No other concerns about appetite/eating or GI problems.    Review of Systems   Constitutional:  Negative for activity change and appetite change.   Respiratory:  Negative for cough and choking.    Gastrointestinal:  Negative for " diarrhea, nausea and vomiting.   Endocrine: Negative for polydipsia and polyuria.   Musculoskeletal:  Negative for gait problem.   Neurological:  Positive for tremors. Negative for dizziness, seizures and syncope.   Psychiatric/Behavioral:  Positive for behavioral problems.       Controlled Substance Evaluation  OARRS/PDMP reviewed: Emil Boone MD on 1/17/2025  6:46 AM  Is the patient prescribed a combination of a benzodiazepine and opioid? No  I have personally reviewed the OARRS report for Damian Martinez.   I have considered the risks of abuse, dependence, addiction and diversion.    I believe that it is clinically appropriate for Damian Martinez to be prescribed this medication.    Last Urine Drug Screen: Unable to complete  No results found for this or any previous visit (from the past 8760 hours).    Controlled Substance Agreement: 10/07/2024  Reviewed Controlled Substance Agreement, including but not limited to:  Benefits, risks, and alternatives to treatment with a controlled substance medication(s).    Prescribed Controlled Substances:  > Benzodiazepines: Lorazepam  What is the patient's goal of therapy? Agitation/anxiety  Is this being achieved with current treatment? Somewhat  Activities of Daily Living:   Is your overall impression that this patient is benefiting (symptom reduction outweighs side effects) from benzodiazepine therapy? Yes   1. Physical Functioning: Same  2. Family Relationship: Same  3. Social Relationship: Same  4. Mood: Same  5. Sleep Patterns: Same  6. Overall Function: Same     MEDICATION HISTORY  Atomoxetine  Buspirone  Clonazepam  Concerta  Depakote  Dexedrine  Fluoxetine  Haldol  Quetiapine  Risperidone  Sertraline  Ziprasidone    CURRENT MEDICATIONS    Current Outpatient Medications:     cloNIDine ER (Kapvay) 0.1 mg tablet extended release 12 hr, Take 1 tablet (0.1 mg) by mouth 2 times a day., Disp: 62 tablet, Rfl: 2    docusate sodium (Colace) 100 mg capsule, Take 1 capsule  (100 mg) by mouth once daily., Disp: , Rfl:     lacosamide (Vimpat) 150 mg tablet tablet, Take 1 tablet (150 mg) by mouth 3 times a day., Disp: , Rfl:     lamoTRIgine (LaMICtal) 100 mg tablet, 200mg QAM and 250mg QHS, Disp: 135 tablet, Rfl: 0    lithium 300 mg tablet, Take 2 tablets (600 mg) by mouth once daily at bedtime., Disp: 62 tablet, Rfl: 2    LORazepam (Ativan) 1 mg tablet, Take 1 tablet (1 mg) by mouth 3 times a day., Disp: 93 tablet, Rfl: 2    nasal spray Nayzilam 5 mg/spray (0.1 mL) spray,non-aerosol, , Disp: , Rfl:     OLANZapine (ZyPREXA) 10 mg tablet, Take 1 tablet (10 mg) by mouth once daily in the morning., Disp: 31 tablet, Rfl: 2    OLANZapine (ZyPREXA) 5 mg tablet, Take 1 tablet (5 mg) by mouth twice daily - 12pm and bedtime., Disp: 62 tablet, Rfl: 2    OLANZapine zydis (ZyPREXA) 15 mg disintegrating tablet, TAKE 1 TABLET BY MOUTH ONCE DAILY AS NEEDED FOR SYMPTOMS OF DISRUPTIVE BEHAVIOR DISORDER (SEVERE SIB OR AGGRESSION) THAT LASTS > 15 MINUTES DESPITE ATTEMPTS TO BEHAVIORALLY REDIRECT, Disp: 30 tablet, Rfl: 1    omeprazole (PriLOSEC) 20 mg DR capsule, Take 1 capsule (20 mg) by mouth once daily., Disp: , Rfl:     polyethylene glycol (Glycolax, Miralax) 17 gram/dose powder, Take 17 g by mouth once daily., Disp: , Rfl:     topiramate (Topamax) 100 mg tablet, Take 1 tablet (100 mg) by mouth 2 times a day., Disp: , Rfl:     SOCIAL HISTORY  Living situation Waiver home with 2:1 staff   Provider agency [facility] as of October 2023   Work or day program None currently   School N/A   Guardianship Mother (Elizabeth)   SSA ?   Bx Specialist ?   Nicotine None   Alcohol None   Other drugs None     OBJECTIVE    Therapeutic Drug Monitoring  12/06/2024: Lamotrigine level = 17.9 [1-13] (450mg/day)  12/06/2024: Lacosamide level = 15.9 [2.2-19.8] (450mg/day)  12/06/2024: Lithium level = 0.5 (600mg/day)  06/21/2024: Lamotrigine level = 10.0 [1-13] (400mg/day)  06/21/2024: Lacosamide level = 8.8 [2.2-19.8]  (400mg/day)  06/21/2024: Topiramate level = 10.1 [5-20] (200mg/day)  03/26/2024: Lithium level = 0.5 (600mg/day)  12/11/2023: Lamotrigine level = 10.2 [1-13] (400mg/day)  12/11/2023: Lacosamide level = 9.7 [2.2-19.8] (400mg/day)  12/11/2023: Topiramate level = 7.7 [5-20] (200mg/day)  04/26/2023: Lamotrigine level = 16.7 [1-13] (400mg/day)  04/26/2023: Lacosamide level = 6.4 [2.2-19.8] (400mg/day)    Electrocardiograms  12/21/2022: SR, VR 67, QTc 419    MENTAL STATUS EXAM  General/Appearance: Appropriate dress/hygiene/grooming. Says he changed out of his jeans into shorts.  Attitude/Behavior: Average eye contact. Superficially cooperative.  Speech/Communication: Scripting. Asks repetitive questions.  Motor:  Some fine positional tremors noted on exam.  Gait: Not assessed at this visit.  Mood: Calm/pleasant.  Affect:  Seems happy. A little bit hyper or elevated at times.  Thought processes: Perseverative.  Thought content: Talks about seeing mom tomorrow.  Perception: Does not appear to be experiencing or responding to hallucinations.  Sensorium/Cognition: Oriented to self only. Intellectual impairment. Minimal interest in participating.  Memory: Not directly assessed at this time.  Insight: Absent.  Judgment: Poor. Redirectable.    ASSESSMENT  Damian has had some sustained stability in terms of agitation/aggression and impulsivity.  We can re-assess treatments after he has planned follow-up with [facility] in February.    PROBLEM LIST  Intellectual developmental disorder, severe  ASD  Disruptive behavior  Anxiety  Epilepsy    PLAN  -- Continue clonidine ER 0.1mg BID for impulsivity   -- Continue lithium 600mg at bedtime for impulse control  -- Continue olanzapine 10mg - 5mg - 5mg (AM, 12:00, HS) for aggression and impulsivity  -- Continue lorazepam 1mg TID (AM, 12:00, HS) for anxiety  -- PRN: olanzapine ODT 15mg PO once daily  -- Follow up 8 weeks    Emil Boone MD    Prep time on date of the  patient encounter: 5 minutes   Time spent directly with patient/family/caregiver: 35 minutes   Additional time spent on patient care activities: 0 minutes   Documentation time: 5 minutes   Other time spent: 0 minutes   Total time on date of patient encounter: 45 minutes

## 2025-03-14 ENCOUNTER — APPOINTMENT (OUTPATIENT)
Dept: BEHAVIORAL HEALTH | Facility: CLINIC | Age: 30
End: 2025-03-14
Payer: MEDICAID

## 2025-03-14 DIAGNOSIS — F91.9 DISRUPTIVE BEHAVIOR DISORDER: Primary | ICD-10-CM

## 2025-03-14 DIAGNOSIS — F41.1 GAD (GENERALIZED ANXIETY DISORDER): ICD-10-CM

## 2025-03-14 DIAGNOSIS — F72 INTELLECTUAL DEVELOPMENTAL DISORDER, SEVERE: ICD-10-CM

## 2025-03-14 DIAGNOSIS — G40.309 NONINTRACTABLE GENERALIZED IDIOPATHIC EPILEPSY WITHOUT STATUS EPILEPTICUS (MULTI): ICD-10-CM

## 2025-03-14 DIAGNOSIS — F84.0 AUTISTIC DISORDER (HHS-HCC): ICD-10-CM

## 2025-03-14 DIAGNOSIS — Z79.899 LITHIUM USE: ICD-10-CM

## 2025-03-14 PROBLEM — R76.8 ANA POSITIVE: Status: ACTIVE | Noted: 2025-02-18

## 2025-03-14 PROBLEM — R26.9 IMPAIRED GAIT: Status: ACTIVE | Noted: 2024-06-03

## 2025-03-14 PROBLEM — K21.9 GASTRO-ESOPHAGEAL REFLUX DISEASE WITHOUT ESOPHAGITIS: Status: ACTIVE | Noted: 2024-01-17

## 2025-03-14 PROCEDURE — 99214 OFFICE O/P EST MOD 30 MIN: CPT | Performed by: PSYCHIATRY & NEUROLOGY

## 2025-03-14 PROCEDURE — 1036F TOBACCO NON-USER: CPT | Performed by: PSYCHIATRY & NEUROLOGY

## 2025-03-14 RX ORDER — LORAZEPAM 1 MG/1
1 TABLET ORAL 3 TIMES DAILY
Qty: 93 TABLET | Refills: 2 | Status: SHIPPED | OUTPATIENT
Start: 2025-03-14

## 2025-03-14 ASSESSMENT — ENCOUNTER SYMPTOMS
DIARRHEA: 0
NAUSEA: 0
DIZZINESS: 0
POLYDIPSIA: 0
CHOKING: 0
APPETITE CHANGE: 0
TREMORS: 1
COUGH: 0
SEIZURES: 0
VOMITING: 0
ACTIVITY CHANGE: 0

## 2025-03-14 NOTE — PATIENT INSTRUCTIONS
Consider increasing lithium from 600mg/day to 750mg/day based on lab results.    Check updated BMP, lithium level, and TSH.  Labs do NOT need to be fasting.  NO morning meds need to be held prior to blood draw.    Next appointment: Friday 5/23/25 at 9:00 AM virtual.

## 2025-03-14 NOTE — PROGRESS NOTES
"Outpatient Adult IDD Psychiatry    A HIPAA-compliant interactive audio and video telecommunication system which permits real time communications between the patient (at the originating site) and provider (at the distant site) was utilized to provide this telehealth service.     The patient, family, caregivers and guardian (as appropriate) have provided consent to conduct treatment via this telehealth service.      The patient's identity and physical location were verified at the time of this visit.     Present for appointment: Damian, mom/guardian (Elizabeth), [facility]  (Indigo), and house DSP (Malika).     Appointment location: Home.  [address] [place] [address] Dr Natasha Sheehan [facility] [address]     SUBJECTIVE    No new health concerns for Damian since we last met on 1/17/25.    He will be seen again in Complex [facility] Clinic at [facility] on 4/16/25 and will be transferring to his new adult neurologist (Dr. Martin) at that time.    He has not had any breakthrough seizures.  His current ASMs are as follows (unchanged from prior visit): lacosamide 450mg/day, lamotrigine 450mg/day, and topiramate 200mg/day.      His levels of impulsive/reactive anger are about the same since last visit.  Upsets are typically related to frustration or impatience when unable to have immediate gratification, particularly when it comes to wanting staff to take him out for car rides.    Indigo notes that since our last visit he has received the as-needed olanzapine a total of 8 times.  Six of those 8 times were marked as being \"effective\" when reassessed 30 minutes after the medication was given.  The time of virtually all of the as-needed medications is typically before 12:00 daily, usually coinciding with moodiness from being overly-tired in the morning (see below).    Indigo notes that Damian has a predictable routine of getting sleepy and napping in the late morning, usually around 10:00, prior to lunch, and that most outings or car rides occur " "in the early afternoon.  If he tries to \"push through\" his sleepiness, this can often quickly trigger aggressive episodes.    He continues to have some episodic problems with walking, both inside and outside the house, in which he will lower himself to the floor/ground.  He will often get up and ambulate better with encouragement from mom or staff.  He doesn't seem to be unsteady when changing from sitting to standing.  Mom has frequently tried to capture these episodes on video, but remarks that Damian will often immediately get up and resume walking when she attempts to do so.  Mom also notes that in the past when he's been shaky or unsteady with walking it has somehow been related to his seizures and often has improved with adjustments (increases) to ASMs.    Sleep patterns have remained stable.  No other concerns about appetite/eating or GI problems.    Review of Systems   Constitutional:  Negative for activity change and appetite change.   Respiratory:  Negative for cough and choking.    Gastrointestinal:  Negative for diarrhea, nausea and vomiting.   Endocrine: Negative for polydipsia and polyuria.   Musculoskeletal:  Positive for gait problem.   Neurological:  Positive for tremors. Negative for dizziness, seizures, syncope and weakness.   Psychiatric/Behavioral:  Positive for behavioral problems. Negative for sleep disturbance.       Controlled Substance Evaluation  OARRS/PDMP reviewed: Emil Boone MD on 3/14/2025  8:13 AM  Is the patient prescribed a combination of a benzodiazepine and opioid? No  I have personally reviewed the OARRS report for Damian Martinez.   I have considered the risks of abuse, dependence, addiction and diversion.    I believe that it is clinically appropriate for Damian Martinez to be prescribed this medication.    Last Urine Drug Screen: Unable to complete currently  No results found for this or any previous visit (from the past 8760 hours).    Controlled Substance Agreement: " 10/07/2024  Reviewed Controlled Substance Agreement, including but not limited to:  Benefits, risks, and alternatives to treatment with a controlled substance medication(s).    Prescribed Controlled Substances:  > Benzodiazepines: Lorazepam  What is the patient's goal of therapy? Agitation/anxiety  Is this being achieved with current treatment? Somewhat  Activities of Daily Living:   Is your overall impression that this patient is benefiting (symptom reduction outweighs side effects) from benzodiazepine therapy? Yes   1. Physical Functioning: Same  2. Family Relationship: Same  3. Social Relationship: Same  4. Mood: Same  5. Sleep Patterns: Same  6. Overall Function: Same     MEDICATION HISTORY  Atomoxetine  Buspirone  Clonazepam  Concerta  Depakote  Dexedrine  Fluoxetine  Haldol  Quetiapine  Risperidone  Sertraline  Ziprasidone    CURRENT MEDICATIONS    Current Outpatient Medications:     cloNIDine ER (Kapvay) 0.1 mg tablet extended release 12 hr, Take 1 tablet (0.1 mg) by mouth 2 times a day., Disp: 62 tablet, Rfl: 11    docusate sodium (Colace) 100 mg capsule, Take 1 capsule (100 mg) by mouth once daily., Disp: , Rfl:     lacosamide (Vimpat) 150 mg tablet tablet, Take 1 tablet (150 mg) by mouth 3 times a day., Disp: , Rfl:     lamoTRIgine (LaMICtal) 100 mg tablet, 200mg QAM and 250mg QHS, Disp: 135 tablet, Rfl: 0    lithium 300 mg tablet, Take 2 tablets (600 mg) by mouth once daily at bedtime., Disp: 62 tablet, Rfl: 11    LORazepam (Ativan) 1 mg tablet, Take 1 tablet (1 mg) by mouth 3 times a day., Disp: 93 tablet, Rfl: 2    nasal spray Nayzilam 5 mg/spray (0.1 mL) spray,non-aerosol, , Disp: , Rfl:     OLANZapine (ZyPREXA) 10 mg tablet, Take 1 tablet (10 mg) by mouth once daily in the morning., Disp: 31 tablet, Rfl: 11    OLANZapine (ZyPREXA) 5 mg tablet, Take 1 tablet (5 mg) by mouth twice daily - 12pm and bedtime., Disp: 62 tablet, Rfl: 11    OLANZapine zydis (ZyPREXA) 15 mg disintegrating tablet, TAKE 1 TABLET  BY MOUTH ONCE DAILY AS NEEDED FOR SYMPTOMS OF DISRUPTIVE BEHAVIOR DISORDER (SEVERE SIB OR AGGRESSION) THAT LASTS > 15 MINUTES DESPITE ATTEMPTS TO BEHAVIORALLY REDIRECT, Disp: 30 tablet, Rfl: 1    omeprazole (PriLOSEC) 20 mg DR capsule, Take 1 capsule (20 mg) by mouth once daily., Disp: , Rfl:     polyethylene glycol (Glycolax, Miralax) 17 gram/dose powder, Take 17 g by mouth once daily., Disp: , Rfl:     topiramate (Topamax) 100 mg tablet, Take 1 tablet (100 mg) by mouth 2 times a day., Disp: , Rfl:     SOCIAL HISTORY  Living situation Waiver home with 2:1 staff   Provider agency [facility] as of October 2023   Work or day program None currently   School N/A   Guardianship Mother (Elizabeth)   SSA ?   Bx Specialist ?   Nicotine None   Alcohol None   Other drugs None     OBJECTIVE    Therapeutic Drug Monitoring  12/06/2024: Lamotrigine level = 17.9 [1-13] (450mg/day)  12/06/2024: Lacosamide level = 15.9 [2.2-19.8] (450mg/day)  12/06/2024: Lithium level = 0.5 (600mg/day)  06/21/2024: Lamotrigine level = 10.0 [1-13] (400mg/day)  06/21/2024: Lacosamide level = 8.8 [2.2-19.8] (400mg/day)  06/21/2024: Topiramate level = 10.1 [5-20] (200mg/day)  03/26/2024: Lithium level = 0.5 (600mg/day)  12/11/2023: Lamotrigine level = 10.2 [1-13] (400mg/day)  12/11/2023: Lacosamide level = 9.7 [2.2-19.8] (400mg/day)  12/11/2023: Topiramate level = 7.7 [5-20] (200mg/day)  04/26/2023: Lamotrigine level = 16.7 [1-13] (400mg/day)  04/26/2023: Lacosamide level = 6.4 [2.2-19.8] (400mg/day)    Electrocardiograms  12/21/2022: SR, VR 67, QTc 419    MENTAL STATUS EXAM  General/Appearance: Appropriate dress/hygiene/grooming.  Attitude/Behavior: Average eye contact. Superficially cooperative.  Speech/Communication: Scripting. Asks repetitive questions.  Motor:  Some fine positional tremors noted on exam.  Gait: Not assessed at this visit.  Mood: Calm/pleasant.  Affect:  Seems happy. A little bit hyper or elevated at times.  Thought processes:  "Perseverative.  Thought content:  \"Jeep's broke,\" \"No Jerry Buck,\" \"Mom's a bad .\" Very excited about the warm weather today and a car ride.  Perception: Does not appear to be experiencing or responding to hallucinations.  Sensorium/Cognition: Oriented to self only. Thought mom was outside the house when he sees her on video. Intellectual impairment. Minimal interest in participating.  Memory: Not directly assessed at this time.  Insight: Absent.  Judgment: Poor. Redirectable.    ASSESSMENT  The general sense from mom and [facility] is that Damian has been slightly more irritable/dixon/impulsive since we last met.  He is due for some updated labs related to monitoring his lithium use, so we might consider increasing lithium by another 150mg per day, depending on lab results.  Appreciate assessments and input from Complex Care team as well.    PROBLEM LIST  Intellectual developmental disorder, severe  ASD  Disruptive behavior  Anxiety  Epilepsy    PLAN  -- Continue clonidine ER 0.1mg BID for impulsivity   -- Continue lithium 600mg at bedtime for mood and impulsivity  -- Continue olanzapine 10mg - 5mg - 5mg (AM, 12:00, HS) for aggression and impulsivity  -- Continue lorazepam 1mg TID (AM, 12:00, HS) for anxiety  -- PRN: olanzapine ODT 15mg PO once daily  -- Orders provided for labs (BMP, TSH, lithium level)  -- Follow up 10 weeks    Emil Boone MD    Prep time on date of the patient encounter: 5 minutes   Time spent directly with patient/family/caregiver: 40 minutes   Additional time spent on patient care activities: 0 minutes   Documentation time: 10 minutes   Other time spent: 0 minutes   Total time on date of patient encounter: 55 minutes      "

## 2025-03-15 ASSESSMENT — ENCOUNTER SYMPTOMS
SLEEP DISTURBANCE: 0
WEAKNESS: 0

## 2025-03-19 LAB
ANION GAP SERPL CALCULATED.4IONS-SCNC: 8 MMOL/L (CALC) (ref 7–17)
BUN SERPL-MCNC: 19 MG/DL (ref 7–25)
BUN/CREAT SERPL: NORMAL (CALC) (ref 6–22)
CALCIUM SERPL-MCNC: 10.1 MG/DL (ref 8.6–10.3)
CHLORIDE SERPL-SCNC: 108 MMOL/L (ref 98–110)
CO2 SERPL-SCNC: 26 MMOL/L (ref 20–32)
CREAT SERPL-MCNC: 1.12 MG/DL (ref 0.6–1.24)
EGFRCR SERPLBLD CKD-EPI 2021: 92 ML/MIN/1.73M2
GLUCOSE SERPL-MCNC: 86 MG/DL (ref 65–99)
LITHIUM SERPL-SCNC: 0.5 MMOL/L (ref 0.6–1.2)
POTASSIUM SERPL-SCNC: 4 MMOL/L (ref 3.5–5.3)
SODIUM SERPL-SCNC: 142 MMOL/L (ref 135–146)
TSH SERPL-ACNC: 1.69 MIU/L (ref 0.4–4.5)

## 2025-04-09 ENCOUNTER — TELEPHONE (OUTPATIENT)
Dept: OTHER | Age: 30
End: 2025-04-09

## 2025-05-23 ENCOUNTER — APPOINTMENT (OUTPATIENT)
Dept: BEHAVIORAL HEALTH | Facility: CLINIC | Age: 30
End: 2025-05-23
Payer: MEDICAID

## 2025-05-23 DIAGNOSIS — F72 INTELLECTUAL DEVELOPMENTAL DISORDER, SEVERE: ICD-10-CM

## 2025-05-23 DIAGNOSIS — G40.309 NONINTRACTABLE GENERALIZED IDIOPATHIC EPILEPSY WITHOUT STATUS EPILEPTICUS (MULTI): ICD-10-CM

## 2025-05-23 DIAGNOSIS — F41.1 GAD (GENERALIZED ANXIETY DISORDER): ICD-10-CM

## 2025-05-23 DIAGNOSIS — F91.9 DISRUPTIVE BEHAVIOR DISORDER: Primary | ICD-10-CM

## 2025-05-23 DIAGNOSIS — F84.0 AUTISTIC DISORDER (HHS-HCC): ICD-10-CM

## 2025-05-23 PROCEDURE — 99214 OFFICE O/P EST MOD 30 MIN: CPT | Performed by: PSYCHIATRY & NEUROLOGY

## 2025-05-23 ASSESSMENT — ENCOUNTER SYMPTOMS
APPETITE CHANGE: 0
TREMORS: 1
COUGH: 0
CHOKING: 0
ACTIVITY CHANGE: 0
DIARRHEA: 0
VOMITING: 0
SLEEP DISTURBANCE: 0
SEIZURES: 0
NAUSEA: 0
DIZZINESS: 0
WEAKNESS: 0
POLYDIPSIA: 0

## 2025-05-23 NOTE — PROGRESS NOTES
"Outpatient Adult IDD Psychiatry    A HIPAA-compliant interactive audio and video telecommunication system which permits real time communications between the patient (at the originating site) and provider (at the distant site) was utilized to provide this telehealth service.     The patient, family, caregivers and guardian (as appropriate) have provided consent to conduct treatment via this telehealth service.      The patient's identity and physical location were verified at the time of this visit.     Present for appointment: Damian, mom/guardian (Elizabeth), [facility]  (Indigo), and [place] DSP.    Appointment location: Home.  [place], [place]    SUBJECTIVE    Since we last met Damian had a follow-up visit in Complex Care Clinic at [facility] on 4/16/25 and met with Neurology (Dr. Martin) at that time.  No changes made to current ASM regimen, and he remains on lacosamide 450mg/day, lamotrigine 450mg/day, and topiramate 200mg/day.  He also had updated labs done at that visit which were generally unremarkable aside from a slightly low iron and TIBC.  He was referred to PT for the walking/gait problems and he did have an assessment with them, but they felt that there was little they could offer him to address these issues.  He has another follow-up at [facility] next week.    Mom explains that after they were leaving the visit and on the way to the parking lot/garage, Damian had one of his \"shaky\" episodes where he was stating he couldn't walk and put himself down on the ground and refused to get up.  Staff had to remain with him while mom went to get the car.  While these episodes are not new for Damian, they do seem to be happening more often and across more settings, making it difficult to take him out in the community.    Additionally, [facility] has documented a clear increase in episodes of agitation and use of as-needed olanzapine in the past 4-6 weeks.  He has received the as-needed olanzapine 11 times since we " "last met, with only about 50% of those instances being marked as \"effective\" when the as-needed medication was used.  Almost all of these episodes have been in the morning, usually clustered around 09:00-10:00.  There was a short time where he was not taking his Focus Factor MV, and is now back on this for the past two weeks, but there has not been any observable correlation with resuming this and his use of as-needed olanzapine.    He is still taking a nap in the mid- to late-morning, with most outings occurring later in the afternoon.    Review of Systems   Constitutional:  Negative for activity change and appetite change.   Respiratory:  Negative for cough and choking.    Gastrointestinal:  Negative for diarrhea, nausea and vomiting.   Endocrine: Negative for polydipsia and polyuria.   Musculoskeletal:  Positive for gait problem.   Neurological:  Positive for tremors. Negative for dizziness, seizures, syncope and weakness.   Psychiatric/Behavioral:  Positive for behavioral problems. Negative for sleep disturbance.       Controlled Substance Evaluation  OARRS/PDMP reviewed: Emil Boone MD on 5/23/2025  6:52 AM  Is the patient prescribed a combination of a benzodiazepine and opioid? No  I have personally reviewed the OARRS report for Damian Martinez.   I have considered the risks of abuse, dependence, addiction and diversion.    I believe that it is clinically appropriate for Damian Martinez to be prescribed this medication.    Last Urine Drug Screen: Unable to complete currently  No results found for this or any previous visit (from the past 8760 hours).    Controlled Substance Agreement: 10/07/2024  Reviewed Controlled Substance Agreement, including but not limited to:  Benefits, risks, and alternatives to treatment with a controlled substance medication(s).    Prescribed Controlled Substances:  > Benzodiazepines: Lorazepam  What is the patient's goal of therapy? Agitation/anxiety  Is this being achieved with " current treatment? Somewhat  Activities of Daily Living:   Is your overall impression that this patient is benefiting (symptom reduction outweighs side effects) from benzodiazepine therapy? Yes   1. Physical Functioning: Same  2. Family Relationship: Same  3. Social Relationship: Same  4. Mood: Same  5. Sleep Patterns: Same  6. Overall Function: Same     MEDICATION HISTORY  Atomoxetine  Buspirone  Clonazepam  Concerta  Depakote  Dexedrine  Fluoxetine  Haldol  Quetiapine  Risperidone  Sertraline  Ziprasidone    CURRENT MEDICATIONS  Medications Prior to Visit[1]    SOCIAL HISTORY  Living situation Waiver home with 2:1 staff   Provider agency [facility] as of October 2023   Work or day program None currently   School N/A   Guardianship Mother (Elizabeth)   SSA ?   Bx Specialist ?   Nicotine None   Alcohol None   Other drugs None     OBJECTIVE    Therapeutic Drug Monitoring  03/18/2025: Lithium level = 0.5 (600mg/day)  12/06/2024: Lamotrigine level = 17.9 [1-13] (450mg/day)  12/06/2024: Lacosamide level = 15.9 [2.2-19.8] (450mg/day)  12/06/2024: Lithium level = 0.5 (600mg/day)  06/21/2024: Lamotrigine level = 10.0 [1-13] (400mg/day)  06/21/2024: Lacosamide level = 8.8 [2.2-19.8] (400mg/day)  06/21/2024: Topiramate level = 10.1 [5-20] (200mg/day)  03/26/2024: Lithium level = 0.5 (600mg/day)  12/11/2023: Lamotrigine level = 10.2 [1-13] (400mg/day)  12/11/2023: Lacosamide level = 9.7 [2.2-19.8] (400mg/day)  12/11/2023: Topiramate level = 7.7 [5-20] (200mg/day)  04/26/2023: Lamotrigine level = 16.7 [1-13] (400mg/day)  04/26/2023: Lacosamide level = 6.4 [2.2-19.8] (400mg/day)    Electrocardiograms  12/21/2022: SR, VR 67, QTc 419    MENTAL STATUS EXAM  General/Appearance: Appropriate dress/hygiene/grooming.  Attitude/Behavior: Average eye contact. Superficially cooperative.  Speech/Communication: Scripting. Asks repetitive questions.  Motor: Some fine positional tremors noted on exam. Perhaps some tic-like facial  "movements.  Gait: Not assessed at this visit.  Mood: Seems a bit hyper.  Affect: Intense. Elevated.  Thought processes: Perseverative.  Thought content: Seems excited about an upcoming dance party and dinner with mom.  Perception: Does not appear to be experiencing or responding to hallucinations.  Sensorium/Cognition: Oriented to self only. Intellectual impairment. Minimal interest in participating.  Memory: Not directly assessed at this time.  Insight: Absent.  Judgment: Poor. Impulsive.    ASSESSMENT  It's possible that Damian's self-reported episodes of feeling \"shaky\" and his refusal to walk could be more consistent with a functional neurological disorder, possibly triggered by stress, anxiety, fatigue, etc.  Today I would recommend trying to gradually increase his clonidine ER to address both impulsivity and possibly some anxiety.  We could consider trying to use a separate as-needed medication (possibly something like a low dose of Xanax) at the onset of one of these episodes to see if it helps the episode resolve.  We will try to monitor a daily BP/HR on Damian for 5 consecutive days after the clonidine increase.    PROBLEM LIST  Intellectual developmental disorder, severe  ASD  Disruptive behavior  Anxiety  Rule out FNSD  Epilepsy    PLAN  -- INCREASE clonidine ER to 0.2mg QAM and 0.1mg at bedtime for impulsivity   -- Continue lithium 600mg at bedtime for mood and impulsivity  -- Continue olanzapine 10mg - 5mg - 5mg (AM, 12:00, HS) for aggression and impulsivity  -- Continue lorazepam 1mg TID (AM, 12:00, HS) for anxiety  -- PRN: olanzapine ODT 15mg PO once daily  -- Follow up 6 weeks    Emil Boone MD    Prep time on date of the patient encounter: 5 minutes   Time spent directly with patient/family/caregiver: 55 minutes   Additional time spent on patient care activities: 0 minutes   Documentation time: 5 minutes   Other time spent: 0 minutes   Total time on date of patient encounter: 65 minutes      "     [1]   Outpatient Medications Prior to Visit   Medication Sig Dispense Refill    cloNIDine ER (Kapvay) 0.1 mg tablet extended release 12 hr Take 1 tablet (0.1 mg) by mouth 2 times a day. 62 tablet 11    docusate sodium (Colace) 100 mg capsule Take 1 capsule (100 mg) by mouth once daily.      lacosamide (Vimpat) 150 mg tablet tablet Take 1 tablet (150 mg) by mouth 3 times a day.      lamoTRIgine (LaMICtal) 100 mg tablet 200mg QAM and 250mg  tablet 0    lithium 300 mg tablet Take 2 tablets (600 mg) by mouth once daily at bedtime. 62 tablet 11    LORazepam (Ativan) 1 mg tablet Take 1 tablet (1 mg) by mouth 3 times a day. 93 tablet 2    nasal spray Nayzilam 5 mg/spray (0.1 mL) spray,non-aerosol       OLANZapine (ZyPREXA) 10 mg tablet Take 1 tablet (10 mg) by mouth once daily in the morning. 31 tablet 11    OLANZapine (ZyPREXA) 5 mg tablet Take 1 tablet (5 mg) by mouth twice daily - 12pm and bedtime. 62 tablet 11    OLANZapine zydis (ZyPREXA) 15 mg disintegrating tablet TAKE 1 TABLET BY MOUTH ONCE DAILY AS NEEDED FOR SYMPTOMS OF DISRUPTIVE BEHAVIOR DISORDER (SEVERE SIB OR AGGRESSION) THAT LASTS > 15 MINUTES DESPITE ATTEMPTS TO BEHAVIORALLY REDIRECT 30 tablet 1    omeprazole (PriLOSEC) 20 mg DR capsule Take 1 capsule (20 mg) by mouth once daily.      polyethylene glycol (Glycolax, Miralax) 17 gram/dose powder Take 17 g by mouth once daily.      topiramate (Topamax) 100 mg tablet Take 1 tablet (100 mg) by mouth 2 times a day.       No facility-administered medications prior to visit.

## 2025-05-23 NOTE — PATIENT INSTRUCTIONS
"It's possible that Damian's self-reported episodes of feeling \"shaky\" and his associated walking difficulties could be more consistent with a functional neurological disorder (ie, conversion episode), possibly triggered by stress, anxiety, fatigue, etc.      Today I would recommend trying to gradually increase his clonidine ER to address both impulsivity and possibly some anxiety.      We could consider trying to use a separate as-needed medication (possibly something like a low dose of Xanax) at the onset of one of these episodes to see if it helps the episode resolve.      We will try to monitor a daily BP/HR on Damian for 5 consecutive days after the clonidine increase.    > Increase AM clonidine ER to 0.2mg (0.1mg x2).  > Check BP & HR for 5 days after dose increase (let physician know if SBP < 90 mmHg).    Next appointment: Wednesday 7/02/25 at 4:00 PM virtual.    "

## 2025-05-24 RX ORDER — CLONIDINE HYDROCHLORIDE 0.1 MG/1
TABLET, EXTENDED RELEASE ORAL
Qty: 93 TABLET | Refills: 1 | Status: SHIPPED | OUTPATIENT
Start: 2025-05-24

## 2025-05-24 RX ORDER — LORAZEPAM 1 MG/1
1 TABLET ORAL 3 TIMES DAILY
Qty: 93 TABLET | Refills: 1 | Status: SHIPPED | OUTPATIENT
Start: 2025-05-24

## 2025-06-02 ENCOUNTER — TELEPHONE (OUTPATIENT)
Dept: BEHAVIORAL HEALTH | Facility: CLINIC | Age: 30
End: 2025-06-02
Payer: MEDICAID

## 2025-06-02 DIAGNOSIS — F84.0 AUTISTIC DISORDER (HHS-HCC): ICD-10-CM

## 2025-06-02 DIAGNOSIS — F41.1 GAD (GENERALIZED ANXIETY DISORDER): ICD-10-CM

## 2025-06-02 DIAGNOSIS — F91.9 DISRUPTIVE BEHAVIOR DISORDER: ICD-10-CM

## 2025-06-02 RX ORDER — LORAZEPAM 1 MG/1
1 TABLET ORAL 3 TIMES DAILY
Qty: 84 TABLET | Refills: 1 | Status: SHIPPED | OUTPATIENT
Start: 2025-06-02

## 2025-06-02 RX ORDER — LITHIUM CARBONATE 300 MG/1
600 CAPSULE ORAL NIGHTLY
COMMUNITY
Start: 2025-05-26

## 2025-06-02 RX ORDER — CLONIDINE HYDROCHLORIDE 0.1 MG/1
TABLET, EXTENDED RELEASE ORAL
Qty: 84 TABLET | Refills: 1 | Status: SHIPPED | OUTPATIENT
Start: 2025-06-02

## 2025-07-02 ENCOUNTER — APPOINTMENT (OUTPATIENT)
Dept: BEHAVIORAL HEALTH | Facility: CLINIC | Age: 30
End: 2025-07-02
Payer: MEDICAID

## 2025-07-02 DIAGNOSIS — F72 INTELLECTUAL DEVELOPMENTAL DISORDER, SEVERE: ICD-10-CM

## 2025-07-02 DIAGNOSIS — F84.0 AUTISTIC DISORDER (HHS-HCC): ICD-10-CM

## 2025-07-02 DIAGNOSIS — F41.1 GAD (GENERALIZED ANXIETY DISORDER): ICD-10-CM

## 2025-07-02 DIAGNOSIS — G40.309 NONINTRACTABLE GENERALIZED IDIOPATHIC EPILEPSY WITHOUT STATUS EPILEPTICUS (MULTI): ICD-10-CM

## 2025-07-02 DIAGNOSIS — F91.9 DISRUPTIVE BEHAVIOR DISORDER: Primary | ICD-10-CM

## 2025-07-02 PROCEDURE — 99214 OFFICE O/P EST MOD 30 MIN: CPT | Performed by: PSYCHIATRY & NEUROLOGY

## 2025-07-02 PROCEDURE — 1036F TOBACCO NON-USER: CPT | Performed by: PSYCHIATRY & NEUROLOGY

## 2025-07-02 RX ORDER — LITHIUM CARBONATE 300 MG/1
600 CAPSULE ORAL NIGHTLY
Qty: 56 CAPSULE | Refills: 12 | Status: SHIPPED | OUTPATIENT
Start: 2025-07-02

## 2025-07-02 RX ORDER — CLONIDINE HYDROCHLORIDE 0.1 MG/1
TABLET, EXTENDED RELEASE ORAL
Qty: 84 TABLET | Refills: 1 | Status: SHIPPED | OUTPATIENT
Start: 2025-07-02

## 2025-07-02 RX ORDER — LORAZEPAM 1 MG/1
1 TABLET ORAL 3 TIMES DAILY
Qty: 84 TABLET | Refills: 1 | Status: SHIPPED | OUTPATIENT
Start: 2025-07-02

## 2025-07-02 ASSESSMENT — ENCOUNTER SYMPTOMS
ACTIVITY CHANGE: 0
VOMITING: 0
DIZZINESS: 0
DIARRHEA: 0
TREMORS: 1
CHOKING: 0
APPETITE CHANGE: 0
WEAKNESS: 0
NAUSEA: 0
SLEEP DISTURBANCE: 0
SEIZURES: 0
POLYDIPSIA: 0
COUGH: 0

## 2025-07-02 NOTE — PATIENT INSTRUCTIONS
We've seen some behavioral improvement since increasing his morning clonidine.  It's possible that it may only be providing several hours worth of benefit, despite being the ER formula.      At this time we are going to try adjusting the timing of his lorazepam so that he receives it every 4 hours during the daytime (instead of getting it at 08:00, 16:00, and 20:00).  If sleep worsens, or if his mornings become more difficult, after removing the bedtime dose of lorazepam, we may have to consider adding back a fourth dose at bedtime.      Based on how he responds to this change, we can decide whether and how to adjust the clonidine as a next step in treatment management.    > Change timing of lorazepam 1mg to 8am, 12pm, and 4pm (remove/discontinue bedtime dose of lorazepam).    Next appointment: Friday 8/15/25 at 10:30 AM lynne.

## 2025-07-02 NOTE — PROGRESS NOTES
"Outpatient Adult IDD Psychiatry    A HIPAA-compliant interactive audio and video telecommunication system which permits real time communications between the patient (at the originating site) and provider (at the distant site) was utilized to provide this telehealth service.     The patient, family, caregivers and guardian (as appropriate) have provided consent to conduct treatment via this telehealth service.      The patient's identity and physical location were verified at the time of this visit.     Present for appointment: Damian, mom/guardian (Elizabeth), [facility] coordinator (Indigo), and house DSP (Katelyn).    Appointment location: Home.  [place], [place]    SUBJECTIVE    Damian has not had any significant health issues/concerns since we last met.    Seems like there may be some improvement in anxiety, hyperactivity, and impulsive outbursts since increasing morning dose of clonidine.  Interestingly, it seems like this may be most helpful, or most noticeable, in the early part of the day as he tends to get more wound-up and impatient in the mid-afternoon time.    He did receive a dose of as-needed medication (olanzapine) earlier today around 09:00.  He has received a total of 6 doses of as-needed medication since we last met, with approximately 50% of them being recorded as \"ineffective.\"    He did have one episode of saying he was \"shaky\" and couldn't walk when he and mom were leaving a restaurant over this past weekend.  He lowered himself to the ground several times every few feet, and mom notes that it took them almost 20 minutes to get from the restaurant back to the car.    Damian's BP/HR was checked for several days after the clonidine increase, with no readings outside of the monitoring parameters.  He has not had any episodes of appearing genuinely unsteady or dizzy, and has not had any involuntary or spontaneous falls.    He has not had any episodes suggestive or, or concerning for, seizures. "  His ASMs are unchanged: lacosamide 450mg/day, lamotrigine 450mg/day, and topiramate 200mg/day.      Review of Systems   Constitutional:  Negative for activity change and appetite change.   Respiratory:  Negative for cough and choking.    Gastrointestinal:  Negative for diarrhea, nausea and vomiting.   Endocrine: Negative for polydipsia and polyuria.   Musculoskeletal:  Positive for gait problem.   Neurological:  Positive for tremors. Negative for dizziness, seizures, syncope and weakness.   Psychiatric/Behavioral:  Positive for behavioral problems. Negative for sleep disturbance.       Controlled Substance Evaluation  OARRS/PDMP reviewed: Emil Boone MD on 7/2/2025  7:19 AM  Is the patient prescribed a combination of a benzodiazepine and opioid? No  I have personally reviewed the OARRS report for Damian aMrtinez.   I have considered the risks of abuse, dependence, addiction and diversion.    I believe that it is clinically appropriate for Damian Martinez to be prescribed this medication.    Last Urine Drug Screen: Unable to complete currently  No results found for this or any previous visit (from the past 8760 hours).    Controlled Substance Agreement: 10/07/2024  Reviewed Controlled Substance Agreement, including but not limited to:  Benefits, risks, and alternatives to treatment with a controlled substance medication(s).    Prescribed Controlled Substances:  > Benzodiazepines: Lorazepam  What is the patient's goal of therapy? Agitation/anxiety  Is this being achieved with current treatment? Somewhat  Activities of Daily Living:   Is your overall impression that this patient is benefiting (symptom reduction outweighs side effects) from benzodiazepine therapy? Yes   1. Physical Functioning: Same  2. Family Relationship: Same  3. Social Relationship: Same  4. Mood: Same  5. Sleep Patterns: Same  6. Overall Function: Same     MEDICATION  HISTORY  Atomoxetine  Buspirone  Clonazepam  Concerta  Depakote  Dexedrine  Fluoxetine  Haldol  Quetiapine  Risperidone  Sertraline  Ziprasidone    CURRENT MEDICATIONS  Medications Prior to Visit[1]    SOCIAL HISTORY  Living situation Waiver home with 2:1 staff   Provider agency [facility] as of October 2023   Work or day program None currently   School N/A   Guardianship Mother (Elizabeth)   SSA ?   Bx Specialist ?   Nicotine None   Alcohol None   Other drugs None     OBJECTIVE    Therapeutic Drug Monitoring  03/18/2025: Lithium level = 0.5 (600mg/day)  12/06/2024: Lamotrigine level = 17.9 [1-13] (450mg/day)  12/06/2024: Lacosamide level = 15.9 [2.2-19.8] (450mg/day)  12/06/2024: Lithium level = 0.5 (600mg/day)  06/21/2024: Lamotrigine level = 10.0 [1-13] (400mg/day)  06/21/2024: Lacosamide level = 8.8 [2.2-19.8] (400mg/day)  06/21/2024: Topiramate level = 10.1 [5-20] (200mg/day)  03/26/2024: Lithium level = 0.5 (600mg/day)  12/11/2023: Lamotrigine level = 10.2 [1-13] (400mg/day)  12/11/2023: Lacosamide level = 9.7 [2.2-19.8] (400mg/day)  12/11/2023: Topiramate level = 7.7 [5-20] (200mg/day)  04/26/2023: Lamotrigine level = 16.7 [1-13] (400mg/day)  04/26/2023: Lacosamide level = 6.4 [2.2-19.8] (400mg/day)    Electrocardiograms  12/21/2022: SR, VR 67, QTc 419    MENTAL STATUS EXAM  General/Appearance: Appropriate dress/hygiene/grooming.  Attitude/Behavior: Average eye contact. Superficially cooperative.  Speech/Communication: Loud volume. Scripting. Asks repetitive questions.  Motor: Some fine positional tremors noted on exam. Eye-blinking tics.  Gait: Not assessed at this visit.  Mood: Seems a bit hyper.  Affect: Intense. Elevated.  Thought processes: Perseverative.  Thought content: Excited about seeing mom for the July 4th holiday.  Perception: Does not appear to be experiencing or responding to hallucinations.  Sensorium/Cognition: Oriented to self only. Intellectual impairment. Minimal interest in  participating.  Memory: Not directly assessed at this time.  Insight: Absent.  Judgment: Poor. Impulsive.    ASSESSMENT  We've seen some behavioral improvement since increasing his morning clonidine.  It's possible that it may only be providing several hours worth of benefit, despite being the ER formula.  At this time we are going to try adjusting the timing of his lorazepam so that he receives it every 4 hours during the daytime (instead of getting it at 08:00, 16:00, and 20:00).  If sleep worsens, or if his mornings become more difficult, after removing the bedtime dose of lorazepam, we may have to consider adding back a fourth dose at bedtime.  Based on how he responds to this change, we can decide whether and how to adjust the clonidine as a next step in treatment management.    PROBLEM LIST  Intellectual developmental disorder, severe  ASD  Disruptive behavior  Anxiety  Rule out FNSD  Epilepsy    PLAN  -- Continue lorazepam 1mg TID (CHANGE TIMING TO 08:00, 12:00, 16:00) for anxiety  -- Continue clonidine ER 0.2mg QAM and 0.1mg at bedtime for impulsivity   -- Continue lithium 600mg at bedtime for mood and impulsivity  -- Continue olanzapine 10mg - 5mg - 5mg (08:00, 12:00, 20:00) for aggression and impulsivity  -- PRN: olanzapine ODT 15mg PO once daily for severe aggressive behaviors  -- Follow up 6 weeks    Emil Boone MD    TIME-BASED BILLING SUMMARY   Preparing to see the patient on date of patient encounter: 5 minutes.  Direct time spent with patient/family/caregiver performing evaluation: 35 minutes.  Documenting clinical information: 10 minutes.  TOTAL TIME SPENT: 50 minutes.         [1]   Outpatient Medications Prior to Visit   Medication Sig Dispense Refill    cloNIDine ER (Kapvay) 0.1 mg tablet extended release 12 hr Take 2 tablets (0.2 mg) every morning and 1 tablet (0.1 mg) at bedtime. 84 tablet 1    docusate sodium (Colace) 100 mg capsule Take 1 capsule (100 mg) by mouth once daily.       lacosamide (Vimpat) 150 mg tablet tablet Take 1 tablet (150 mg) by mouth 3 times a day.      lamoTRIgine (LaMICtal) 100 mg tablet 200mg QAM and 250mg  tablet 0    lithium 300 mg capsule Take 2 capsules (600 mg) by mouth once daily at bedtime.      LORazepam (Ativan) 1 mg tablet Take 1 tablet (1 mg) by mouth 3 times a day. 84 tablet 1    nasal spray Nayzilam 5 mg/spray (0.1 mL) spray,non-aerosol       OLANZapine (ZyPREXA) 10 mg tablet Take 1 tablet (10 mg) by mouth once daily in the morning. 31 tablet 11    OLANZapine (ZyPREXA) 5 mg tablet Take 1 tablet (5 mg) by mouth twice daily - 12pm and bedtime. 62 tablet 11    OLANZapine zydis (ZyPREXA) 15 mg disintegrating tablet TAKE 1 TABLET BY MOUTH ONCE DAILY AS NEEDED FOR SYMPTOMS OF DISRUPTIVE BEHAVIOR DISORDER (SEVERE SIB OR AGGRESSION) THAT LASTS > 15 MINUTES DESPITE ATTEMPTS TO BEHAVIORALLY REDIRECT 30 tablet 1    omeprazole (PriLOSEC) 20 mg DR capsule Take 1 capsule (20 mg) by mouth once daily.      polyethylene glycol (Glycolax, Miralax) 17 gram/dose powder Take 17 g by mouth once daily.      topiramate (Topamax) 100 mg tablet Take 1 tablet (100 mg) by mouth 2 times a day.       No facility-administered medications prior to visit.

## 2025-08-15 ENCOUNTER — APPOINTMENT (OUTPATIENT)
Dept: BEHAVIORAL HEALTH | Facility: CLINIC | Age: 30
End: 2025-08-15
Payer: MEDICAID

## 2025-08-15 DIAGNOSIS — F84.0 AUTISTIC DISORDER (HHS-HCC): ICD-10-CM

## 2025-08-15 DIAGNOSIS — G40.309 NONINTRACTABLE GENERALIZED IDIOPATHIC EPILEPSY WITHOUT STATUS EPILEPTICUS (MULTI): ICD-10-CM

## 2025-08-15 DIAGNOSIS — F72 INTELLECTUAL DEVELOPMENTAL DISORDER, SEVERE: ICD-10-CM

## 2025-08-15 DIAGNOSIS — F91.9 DISRUPTIVE BEHAVIOR DISORDER: Primary | ICD-10-CM

## 2025-08-15 DIAGNOSIS — F41.1 GAD (GENERALIZED ANXIETY DISORDER): ICD-10-CM

## 2025-08-15 PROCEDURE — 99214 OFFICE O/P EST MOD 30 MIN: CPT | Performed by: PSYCHIATRY & NEUROLOGY

## 2025-08-15 PROCEDURE — 1036F TOBACCO NON-USER: CPT | Performed by: PSYCHIATRY & NEUROLOGY

## 2025-08-15 RX ORDER — ALPRAZOLAM 1 MG/1
TABLET, ORALLY DISINTEGRATING ORAL
Qty: 10 TABLET | Refills: 0 | Status: SHIPPED | OUTPATIENT
Start: 2025-08-15

## 2025-08-15 RX ORDER — LORAZEPAM 1 MG/1
1 TABLET ORAL 4 TIMES DAILY
Qty: 120 TABLET | Refills: 1 | Status: SHIPPED | OUTPATIENT
Start: 2025-08-15

## 2025-08-15 ASSESSMENT — ENCOUNTER SYMPTOMS
DIZZINESS: 0
SEIZURES: 0
COUGH: 0
WEAKNESS: 0
SLEEP DISTURBANCE: 0
POLYDIPSIA: 0
DIARRHEA: 0
VOMITING: 0
ACTIVITY CHANGE: 0
CHOKING: 0
NAUSEA: 0
APPETITE CHANGE: 0

## 2025-08-22 DIAGNOSIS — F91.9 DISRUPTIVE BEHAVIOR DISORDER: Primary | ICD-10-CM

## 2025-08-25 RX ORDER — CLONIDINE HYDROCHLORIDE 0.1 MG/1
TABLET, EXTENDED RELEASE ORAL
Qty: 84 TABLET | Refills: 1 | Status: SHIPPED | OUTPATIENT
Start: 2025-08-25

## 2025-10-03 ENCOUNTER — APPOINTMENT (OUTPATIENT)
Dept: BEHAVIORAL HEALTH | Facility: CLINIC | Age: 30
End: 2025-10-03
Payer: MEDICAID